# Patient Record
Sex: MALE | Race: WHITE | Employment: OTHER | URBAN - METROPOLITAN AREA
[De-identification: names, ages, dates, MRNs, and addresses within clinical notes are randomized per-mention and may not be internally consistent; named-entity substitution may affect disease eponyms.]

---

## 2019-10-23 ENCOUNTER — ANESTHESIA EVENT (OUTPATIENT)
Dept: ENDOSCOPY | Age: 74
End: 2019-10-23
Payer: MEDICARE

## 2019-10-23 ENCOUNTER — HOSPITAL ENCOUNTER (OUTPATIENT)
Age: 74
Setting detail: OUTPATIENT SURGERY
Discharge: HOME | End: 2019-10-23
Attending: INTERNAL MEDICINE | Admitting: INTERNAL MEDICINE
Payer: MEDICARE

## 2019-10-23 ENCOUNTER — ANESTHESIA (OUTPATIENT)
Dept: ENDOSCOPY | Age: 74
End: 2019-10-23
Payer: MEDICARE

## 2019-10-23 VITALS
BODY MASS INDEX: 22.82 KG/M2 | SYSTOLIC BLOOD PRESSURE: 112 MMHG | WEIGHT: 168.5 LBS | TEMPERATURE: 97.8 F | HEART RATE: 81 BPM | OXYGEN SATURATION: 98 % | DIASTOLIC BLOOD PRESSURE: 69 MMHG | RESPIRATION RATE: 20 BRPM | HEIGHT: 72 IN

## 2019-10-23 PROCEDURE — 88305 TISSUE EXAM BY PATHOLOGIST: CPT

## 2019-10-23 PROCEDURE — 76040000019: Performed by: INTERNAL MEDICINE

## 2019-10-23 PROCEDURE — 77030013992 HC SNR POLYP ENDOSC BSC -B: Performed by: INTERNAL MEDICINE

## 2019-10-23 PROCEDURE — 77030010936 HC CLP LIG BSC -C: Performed by: INTERNAL MEDICINE

## 2019-10-23 PROCEDURE — 74011250636 HC RX REV CODE- 250/636: Performed by: NURSE ANESTHETIST, CERTIFIED REGISTERED

## 2019-10-23 PROCEDURE — 76060000031 HC ANESTHESIA FIRST 0.5 HR: Performed by: INTERNAL MEDICINE

## 2019-10-23 PROCEDURE — 74011000250 HC RX REV CODE- 250: Performed by: NURSE ANESTHETIST, CERTIFIED REGISTERED

## 2019-10-23 RX ORDER — SODIUM CHLORIDE 0.9 % (FLUSH) 0.9 %
5-40 SYRINGE (ML) INJECTION AS NEEDED
Status: DISCONTINUED | OUTPATIENT
Start: 2019-10-23 | End: 2019-10-23 | Stop reason: HOSPADM

## 2019-10-23 RX ORDER — FLUMAZENIL 0.1 MG/ML
0.2 INJECTION INTRAVENOUS
Status: DISCONTINUED | OUTPATIENT
Start: 2019-10-23 | End: 2019-10-23 | Stop reason: HOSPADM

## 2019-10-23 RX ORDER — SODIUM CHLORIDE 9 MG/ML
50 INJECTION, SOLUTION INTRAVENOUS CONTINUOUS
Status: DISCONTINUED | OUTPATIENT
Start: 2019-10-23 | End: 2019-10-23 | Stop reason: HOSPADM

## 2019-10-23 RX ORDER — PRAVASTATIN SODIUM 10 MG/1
10 TABLET ORAL
COMMUNITY

## 2019-10-23 RX ORDER — ATROPINE SULFATE 0.1 MG/ML
0.5 INJECTION INTRAVENOUS
Status: DISCONTINUED | OUTPATIENT
Start: 2019-10-23 | End: 2019-10-23 | Stop reason: HOSPADM

## 2019-10-23 RX ORDER — DEXTROMETHORPHAN/PSEUDOEPHED 2.5-7.5/.8
1.2 DROPS ORAL
Status: DISCONTINUED | OUTPATIENT
Start: 2019-10-23 | End: 2019-10-23 | Stop reason: HOSPADM

## 2019-10-23 RX ORDER — NALOXONE HYDROCHLORIDE 0.4 MG/ML
0.4 INJECTION, SOLUTION INTRAMUSCULAR; INTRAVENOUS; SUBCUTANEOUS
Status: DISCONTINUED | OUTPATIENT
Start: 2019-10-23 | End: 2019-10-23 | Stop reason: HOSPADM

## 2019-10-23 RX ORDER — BUMETANIDE 1 MG/1
1 TABLET ORAL EVERY OTHER DAY
COMMUNITY
End: 2021-02-17

## 2019-10-23 RX ORDER — PROPOFOL 10 MG/ML
INJECTION, EMULSION INTRAVENOUS AS NEEDED
Status: DISCONTINUED | OUTPATIENT
Start: 2019-10-23 | End: 2019-10-23 | Stop reason: HOSPADM

## 2019-10-23 RX ORDER — FLECAINIDE ACETATE 50 MG/1
50 TABLET ORAL 2 TIMES DAILY
COMMUNITY
End: 2021-02-17

## 2019-10-23 RX ORDER — SODIUM CHLORIDE 0.9 % (FLUSH) 0.9 %
5-40 SYRINGE (ML) INJECTION EVERY 8 HOURS
Status: DISCONTINUED | OUTPATIENT
Start: 2019-10-23 | End: 2019-10-23 | Stop reason: HOSPADM

## 2019-10-23 RX ORDER — EPINEPHRINE 0.1 MG/ML
1 INJECTION INTRACARDIAC; INTRAVENOUS
Status: DISCONTINUED | OUTPATIENT
Start: 2019-10-23 | End: 2019-10-23 | Stop reason: HOSPADM

## 2019-10-23 RX ORDER — LIDOCAINE HYDROCHLORIDE 20 MG/ML
INJECTION, SOLUTION EPIDURAL; INFILTRATION; INTRACAUDAL; PERINEURAL AS NEEDED
Status: DISCONTINUED | OUTPATIENT
Start: 2019-10-23 | End: 2019-10-23 | Stop reason: HOSPADM

## 2019-10-23 RX ADMIN — PROPOFOL 100 MG: 10 INJECTION, EMULSION INTRAVENOUS at 10:53

## 2019-10-23 RX ADMIN — PROPOFOL 100 MG: 10 INJECTION, EMULSION INTRAVENOUS at 10:45

## 2019-10-23 RX ADMIN — LIDOCAINE HYDROCHLORIDE 20 MG: 20 INJECTION, SOLUTION EPIDURAL; INFILTRATION; INTRACAUDAL; PERINEURAL at 10:45

## 2019-10-23 NOTE — ROUTINE PROCESS
Jackie Miranda 1945 
[id number] Situation: 
[facility] Procedure: Procedure(s): 
COLONOSCOPY 
ENDOSCOPIC POLYPECTOMY RESOLUTION CLIP Background: 
 
Preoperative diagnosis: HEMO POS STOOL Postoperative diagnosis: Cecal Polyp :  Dr. Hafsa Pablo Assistant(s): Endoscopy Technician-1: Ramiro Carlson Endoscopy RN-1: Joie Reid RN Specimens:  
ID Type Source Tests Collected by Time Destination 1 : Cecal Polyp Preservative   Noelia Cogan, MD 10/23/2019 1056 Pathology H. Pylori  no Assessment: 
Intra-procedure medications Anesthesia gave intra-procedure sedation and medications, see anesthesia flow sheet yes Intravenous fluids: NS@ Lorrain Mehrdad Vital signs stable Abdominal assessment: round and soft Recommendation: 
Discharge patient per MD order. Family or Friend Permission to share finding with family or friend yes

## 2019-10-23 NOTE — PROGRESS NOTES
Received report from anesthesia staff on vital signs and status of patient.     Scope precleaned at bedside by Rosales Scott

## 2019-10-23 NOTE — ANESTHESIA POSTPROCEDURE EVALUATION
Post-Anesthesia Evaluation and Assessment    Patient: Curtis Borja MRN: [id number]  SSN: [id number]    YOB: 1945  Age: 72 y.o. Sex: male      I have evaluated the patient and they are stable and ready for discharge from the PACU. Cardiovascular Function/Vital Signs  Visit Vitals  /79   Pulse 97   Temp 36.6 °C (97.8 °F)   Resp (!) 35   Ht 6' (1.829 m)   Wt 76.4 kg (168 lb 8 oz)   SpO2 99%   BMI 22.85 kg/m²       Patient is status post MAC anesthesia for Procedure(s):  COLONOSCOPY  ENDOSCOPIC POLYPECTOMY  RESOLUTION CLIP. Nausea/Vomiting: None    Postoperative hydration reviewed and adequate. Pain:  Pain Scale 1: Numeric (0 - 10) (10/23/19 1116)  Pain Intensity 1: 0 (10/23/19 1116)   Managed    Neurological Status: At baseline    Mental Status, Level of Consciousness: Alert and  oriented to person, place, and time    Pulmonary Status:   O2 Device: Room air (10/23/19 0947)   Adequate oxygenation and airway patent    Complications related to anesthesia: None    Post-anesthesia assessment completed. No concerns    Signed By: Melly Chang MD     October 23, 2019              Procedure(s):  COLONOSCOPY  ENDOSCOPIC POLYPECTOMY  RESOLUTION CLIP. MAC    <BSHSIANPOST>    Vitals Value Taken Time   /69 10/23/2019 11:25 AM   Temp 36.6 °C (97.8 °F) 10/23/2019 11:16 AM   Pulse 83 10/23/2019 11:26 AM   Resp 24 10/23/2019 11:26 AM   SpO2 98 % 10/23/2019 11:26 AM   Vitals shown include unvalidated device data.

## 2019-10-23 NOTE — DISCHARGE INSTRUCTIONS
[address]  [address]          Nicole Mejia  [id number]  1945    COLON DISCHARGE INSTRUCTIONS    DISCOMFORT:  Redness at IV site- apply warm compress to area; if redness or soreness persist- contact your physician  There may be a slight amount of blood passed from the rectum  Gaseous discomfort- walking, belching will help relieve any discomfort  You may not operate a vehicle for 12 hours  You may not engage in an occupation involving machinery or appliances for rest of today  You may not drink alcoholic beverages for at least 12 hours  Avoid making any critical decisions for at least 24 hour  DIET:   Regular diet. - however -  remember your colon is empty and a heavy meal will produce gas. Avoid these foods:  vegetables, fried / greasy foods, carbonated drinks for today         ACTIVITY:  You may resume your normal daily activities it is recommended that you spend the remainder of the day resting -  avoid any strenuous activity. CALL M.D. ANY SIGN OF:   Increasing pain, nausea, vomiting  Abdominal distension (swelling)  New increased bleeding (oral or rectal)  Fever (chills)  Pain in chest area  Bloody discharge from nose or mouth  Shortness of breath     Follow-up Instructions:   Call Dr. Reina Sharma for any questions or problems. Telephone # [phone]  Biopsy results will be available in  5 to 7 days  Should have a repeat colonoscopy in 5 years  Restart eliquis in 5 days    Impression:  Cecal Polyp  Patient Education        Colon Polyps: Care Instructions  Your Care Instructions    Colon polyps are growths in the colon or the rectum. The cause of most colon polyps is not known, and most people who get them do not have any problems. But a certain kind can turn into cancer. For this reason, regular testing for colon polyps is important for people as they get older.  It is also important for anyone who has an increased risk for colon cancer. Polyps are usually found through routine colon cancer screening tests. Although most colon polyps are not cancerous, they are usually removed and then tested for cancer. Screening for colon cancer saves lives because the cancer can usually be cured if it is caught early. If you have a polyp that is the type that can turn into cancer, you may need more tests to examine your entire colon. The doctor will remove any other polyps that he or she finds, and you will be tested more often. Follow-up care is a key part of your treatment and safety. Be sure to make and go to all appointments, and call your doctor if you are having problems. It's also a good idea to know your test results and keep a list of the medicines you take. How can you care for yourself at home? Regular exams to look for colon polyps are the best way to prevent polyps from turning into colon cancer. These can include stool tests, sigmoidoscopy, colonoscopy, and CT colonography. Talk with your doctor about a testing schedule that is right for you. To prevent polyps  There is no home treatment that can prevent colon polyps. But these steps may help lower your risk for cancer. · Stay active. Being active can help you get to and stay at a healthy weight. Try to exercise on most days of the week. Walking is a good choice. · Eat well. Choose a variety of vegetables, fruits, legumes (such as peas and beans), fish, poultry, and whole grains. · Do not smoke. If you need help quitting, talk to your doctor about stop-smoking programs and medicines. These can increase your chances of quitting for good. · If you drink alcohol, limit how much you drink. Limit alcohol to 2 drinks a day for men and 1 drink a day for women. When should you call for help?   Call your doctor now or seek immediate medical care if:    · You have severe belly pain.     · Your stools are maroon or very bloody.    Watch closely for changes in your health, and be sure to contact your doctor if:    · You have a fever.     · You have nausea or vomiting.     · You have a change in bowel habits (new constipation or diarrhea).     · Your symptoms get worse or are not improving as expected. Where can you learn more? Go to http://tigre-monika.info/. Enter 95 [id number] in the search box to learn more about \"Colon Polyps: Care Instructions. \"  Current as of: December 19, 2018  Content Version: 12.2  © 7539-9440 [employer]. Care instructions adapted under license by [employer] (which disclaims liability or warranty for this information). If you have questions about a medical condition or this instruction, always ask your healthcare professional. [address] any warranty or liability for your use of this information.

## 2019-10-23 NOTE — H&P
Eliud 66  174 [facility], [address] [place] [address]                 Jacob Serrano is a  72 y.o.  male who presents with heme positive stool, but has been on eliquis. No GI symptoms .         Past Medical History:   Diagnosis Date    [facility] [address]     Borderline hypothyroidism     Diabetes type 1, controlled ([facility] [address]     Patient has insulin pump    History of cardioversion October 2014    PVC (premature ventricular contraction)     S/P ablation of atrial flutter March 2016     Past Surgical History:   Procedure Laterality Date    COLONOSCOPY N/A 6/21/2016    COLONOSCOPY performed by Diane Pittman MD at [address] HX CATARACT REMOVAL Bilateral     HX COLONOSCOPY  2011    HX OTHER SURGICAL  March 2016    A-flutter ablation    HX OTHER SURGICAL      Penile implant    HX TONSILLECTOMY       Allergies   Allergen Reactions    Hydrocodone Other (comments)     Upset stomach    Peanut Sneezing    Sulfa (Sulfonamide Antibiotics) Runny Nose and Sneezing     Current Facility-Administered Medications   Medication Dose Route Frequency Provider Last Rate Last Dose    0.9% sodium chloride infusion  50 mL/hr IntraVENous CONTINUOUS Jhoan Waite MD        sodium chloride (NS) flush 5-40 mL  5-40 mL IntraVENous Q8H Jhoan Waite MD        sodium chloride (NS) flush 5-40 mL  5-40 mL IntraVENous PRN Jhoan Waite MD        naloxone [facility]) injection 0.4 mg  0.4 mg IntraVENous Ira Waite MD        flumazenil (ROMAZICON) 0.1 mg/mL injection 0.2 mg  0.2 mg IntraVENous Ira Waite MD        simethicone (MYLICON) 62RM/4.4YT oral drops 80 mg  1.2 mL Oral Ira Waite MD        atropine injection 0.5 mg  0.5 mg IntraVENous ONCE PRN Jhoan Waite MD        EPINEPHrine (ADRENALIN) 0.1 mg/mL syringe 1 mg  1 mg Endoscopically ONCE PRN Jhoan Waite MD           Visit Vitals  /85   Pulse 93   Resp 17   Ht 6' (1.829 m) Wt 76.4 kg (168 lb 8 oz)   SpO2 99%   BMI 22.85 kg/m²           PHYSICAL EXAM:  General: WD, WN. Alert, cooperative, no acute distress    HEENT: NC, Atraumatic. PERRLA, EOMI. Anicteric sclerae. Mallampati score 2  Lungs:  CTA Bilaterally. No Wheezing/Rhonchi/Rales. Heart:  Regular  rhythm,  No murmur (), No Rubs, No Gallops  Abdomen: Soft, Non distended, Non tender.  +Bowel sounds, no HSM  Extremities: No c/c/e  Neurologic:  CN 2-12 gi, Alert and oriented X 3. No acute neurological distress   Psych:   Good insight. Not anxious nor agitated. Plan:   Endoscopic procedure with MAC.     Carley Mckeon MD  10/23/2019  10:42 AM

## 2019-10-23 NOTE — ANESTHESIA PREPROCEDURE EVALUATION
Anesthetic History   No history of anesthetic complications            Review of Systems / Medical History  Patient summary reviewed, nursing notes reviewed and pertinent labs reviewed    Pulmonary  Within defined limits                 Neuro/Psych   Within defined limits           Cardiovascular    Hypertension        Dysrhythmias : atrial flutter and PVC  Hyperlipidemia      Comments: S/p ablation   GI/Hepatic/Renal  Within defined limits              Endo/Other    Diabetes: type 1, using insulin         Other Findings   Comments: DM neuropathy/ retinopathy           Physical Exam    Airway  Mallampati: II  TM Distance: > 6 cm  Neck ROM: normal range of motion   Mouth opening: Normal     Cardiovascular  Regular rate and rhythm,  S1 and S2 normal,  no murmur, click, rub, or gallop             Dental  No notable dental hx       Pulmonary  Breath sounds clear to auscultation               Abdominal  GI exam deferred       Other Findings            Anesthetic Plan    ASA: 3  Anesthesia type: MAC          Induction: Intravenous  Anesthetic plan and risks discussed with: Patient

## 2019-10-23 NOTE — PROCEDURES
Eliud 66  174 [facility], [address] [place] [address]              :  Gurjit Torres MD    Surgical Assistant: None    Implants: None    Referring Provider: Judith Rosa MD    Sedation:  MAC anesthesia Propofol        Prior to the procedure its objectives, risks, consequences and alternatives were discussed with the patient who then elected to proceed. The patient had the opportunity to ask questions and those questions were answered. A physical exam was performed. The heart, lungs, and mental status were examined prior to the procedure and found to be satisfactory for conscious sedation and for the procedure. Conscious sedation was initiated by the physician. Continuous pulse oximetry and blood pressure monitoring were used throughout the procedure. After appropriate analgesia and rectal exam, the colonoscope was passed into the anus and passed to the cecum without difficulty. The prep was good. On slow withdrawal of the scope, there was an 8 mm sessile polyp in the base of the cecum The polyp was removed with snare and cautery and 2 clips were as he has to restart eliquis soon. The ascending colon, hepatic flexure, transverse colon, splenic flexure, descending colon, sigmoid and rectum were normal. Retroflexion exam of the rectum was normal. He tolerated the procedure without complication and I recommend a repeat colonoscopy in 5 years. Specimen cecal polyp    Complications: None. EBL:  None.     Gurjit Torres MD  10/23/2019  11:07 AM

## 2021-02-05 ENCOUNTER — TRANSCRIBE ORDER (OUTPATIENT)
Dept: REGISTRATION | Age: 76
End: 2021-02-05

## 2021-02-05 DIAGNOSIS — Z01.812 PRE-PROCEDURE LAB EXAM: Primary | ICD-10-CM

## 2021-02-05 NOTE — PERIOP NOTES
PATIENT CALLED AND MADE AWARE OF COVID-19 TESTING REQUIRED TO BE DONE WITHIN 96 HOURS OF SURGERY. COVID-19 TESTING APPOINTMENT MADE FOR PATIENT. INSTRUCTED ON SELF QUARANTINE BETWEEN TESTING AND ARRIVAL TIME DAY OF SURGERY. INSTRUCTED NOT TO USE NASAL SPRAY OR OINTMENTS DAY OF TESTING, PRIOR TO TEST. LOCATION OF TESTING DISCUSSED.

## 2021-02-14 ENCOUNTER — HOSPITAL ENCOUNTER (OUTPATIENT)
Dept: PREADMISSION TESTING | Age: 76
Discharge: HOME | End: 2021-02-14
Payer: MEDICARE

## 2021-02-14 DIAGNOSIS — Z01.812 PRE-PROCEDURE LAB EXAM: ICD-10-CM

## 2021-02-14 PROCEDURE — U0003 INFECTIOUS AGENT DETECTION BY NUCLEIC ACID (DNA OR RNA); SEVERE ACUTE RESPIRATORY SYNDROME CORONAVIRUS 2 (SARS-COV-2) (CORONAVIRUS DISEASE [COVID-19]), AMPLIFIED PROBE TECHNIQUE, MAKING USE OF HIGH THROUGHPUT TECHNOLOGIES AS DESCRIBED BY CMS-2020-01-R: HCPCS

## 2021-02-15 LAB — SARS-COV-2, COV2NT: NOT DETECTED

## 2021-02-17 RX ORDER — VERAPAMIL HYDROCHLORIDE 240 MG/1
240 CAPSULE, EXTENDED RELEASE ORAL
COMMUNITY

## 2021-02-17 RX ORDER — ASPIRIN 81 MG/1
81 TABLET ORAL DAILY
COMMUNITY

## 2021-02-17 NOTE — PERIOP NOTES
Preop phone call completed. Preop instructions and preop medications reveiwed with patient. Pt instructed to Purchase 2  4 oz bottles of CHG soap and instructed in use. PT HAS INSULIN PUMP. DTC REFERRAL INITIATED. [facility]PO[facility] AND NOTIFIED HER PT IS SCHEDULED FOR SURGERY 2/18/21. Called for cardiology and nephrology notes    Nephrology notes received and placed on chart. CARDIOLOGY NOTES/CARDIAC CLEARANCE RECEIVED AND PLACED ON CHART.

## 2021-02-18 ENCOUNTER — HOSPITAL ENCOUNTER (OUTPATIENT)
Age: 76
Setting detail: OBSERVATION
Discharge: HOME | End: 2021-02-21
Attending: PODIATRIST | Admitting: PODIATRIST
Payer: MEDICARE

## 2021-02-18 ENCOUNTER — APPOINTMENT (OUTPATIENT)
Dept: GENERAL RADIOLOGY | Age: 76
End: 2021-02-18
Attending: PODIATRIST
Payer: MEDICARE

## 2021-02-18 ENCOUNTER — ANESTHESIA EVENT (OUTPATIENT)
Dept: SURGERY | Age: 76
End: 2021-02-18
Payer: MEDICARE

## 2021-02-18 ENCOUNTER — ANESTHESIA (OUTPATIENT)
Dept: SURGERY | Age: 76
End: 2021-02-18
Payer: MEDICARE

## 2021-02-18 DIAGNOSIS — M14.672 CHARCOT'S JOINT OF LEFT FOOT: Primary | ICD-10-CM

## 2021-02-18 DIAGNOSIS — E10.65 HYPERGLYCEMIA DUE TO TYPE 1 DIABETES MELLITUS (HCC): ICD-10-CM

## 2021-02-18 LAB
GLUCOSE BLD STRIP.AUTO-MCNC: 103 MG/DL (ref 65–100)
GLUCOSE BLD STRIP.AUTO-MCNC: 113 MG/DL (ref 65–100)
GLUCOSE BLD STRIP.AUTO-MCNC: 124 MG/DL (ref 65–100)
GLUCOSE BLD STRIP.AUTO-MCNC: 145 MG/DL (ref 65–100)
GLUCOSE BLD STRIP.AUTO-MCNC: 160 MG/DL (ref 65–100)
GLUCOSE BLD STRIP.AUTO-MCNC: 82 MG/DL (ref 65–100)
GLUCOSE BLD STRIP.AUTO-MCNC: 98 MG/DL (ref 65–100)
SERVICE CMNT-IMP: ABNORMAL
SERVICE CMNT-IMP: NORMAL
SERVICE CMNT-IMP: NORMAL

## 2021-02-18 PROCEDURE — 77030038552 HC DRN WND MDII -A: Performed by: PODIATRIST

## 2021-02-18 PROCEDURE — 73620 X-RAY EXAM OF FOOT: CPT

## 2021-02-18 PROCEDURE — C1713 ANCHOR/SCREW BN/BN,TIS/BN: HCPCS | Performed by: PODIATRIST

## 2021-02-18 PROCEDURE — 77030008684 HC TU ET CUF COVD -B: Performed by: ANESTHESIOLOGY

## 2021-02-18 PROCEDURE — 77030031139 HC SUT VCRL2 J&J -A: Performed by: PODIATRIST

## 2021-02-18 PROCEDURE — 77030020268 HC MISC GENERAL SUPPLY: Performed by: PODIATRIST

## 2021-02-18 PROCEDURE — 74011250636 HC RX REV CODE- 250/636: Performed by: NURSE ANESTHETIST, CERTIFIED REGISTERED

## 2021-02-18 PROCEDURE — 82962 GLUCOSE BLOOD TEST: CPT

## 2021-02-18 PROCEDURE — 77030025006 HC BUR STRY -C: Performed by: PODIATRIST

## 2021-02-18 PROCEDURE — 74011250636 HC RX REV CODE- 250/636: Performed by: ANESTHESIOLOGY

## 2021-02-18 PROCEDURE — 2709999900 HC NON-CHARGEABLE SUPPLY: Performed by: PODIATRIST

## 2021-02-18 PROCEDURE — 74011000250 HC RX REV CODE- 250: Performed by: PODIATRIST

## 2021-02-18 PROCEDURE — 74011000250 HC RX REV CODE- 250: Performed by: NURSE ANESTHETIST, CERTIFIED REGISTERED

## 2021-02-18 PROCEDURE — 77030002916 HC SUT ETHLN J&J -A: Performed by: PODIATRIST

## 2021-02-18 PROCEDURE — 77030000032 HC CUF TRNQT ZIMM -B: Performed by: PODIATRIST

## 2021-02-18 PROCEDURE — 77030040922 HC BLNKT HYPOTHRM STRY -A

## 2021-02-18 PROCEDURE — C1769 GUIDE WIRE: HCPCS | Performed by: PODIATRIST

## 2021-02-18 PROCEDURE — 77030002922 HC SUT FBRWRE ARTH -B: Performed by: PODIATRIST

## 2021-02-18 PROCEDURE — 74011250637 HC RX REV CODE- 250/637: Performed by: ANESTHESIOLOGY

## 2021-02-18 PROCEDURE — 77030010435 HC GRFT BN SUB PGNX MEDT -G1: Performed by: PODIATRIST

## 2021-02-18 PROCEDURE — 74011250636 HC RX REV CODE- 250/636: Performed by: PODIATRIST

## 2021-02-18 PROCEDURE — 77030020269 HC MISC IMPL: Performed by: PODIATRIST

## 2021-02-18 PROCEDURE — 76060000052 HC ANESTHESIA 10.5 TO 11 HR: Performed by: PODIATRIST

## 2021-02-18 PROCEDURE — 76210000017 HC OR PH I REC 1.5 TO 2 HR: Performed by: PODIATRIST

## 2021-02-18 PROCEDURE — 76010000150: Performed by: PODIATRIST

## 2021-02-18 PROCEDURE — 77030003601 HC NDL NRV BLK BBMI -A

## 2021-02-18 PROCEDURE — 77030026438 HC STYL ET INTUB CARD -A: Performed by: ANESTHESIOLOGY

## 2021-02-18 PROCEDURE — 99218 HC RM OBSERVATION: CPT

## 2021-02-18 DEVICE — SCREW, HEADLESS 6.5X070MM_TI GREEN
Type: IMPLANTABLE DEVICE | Site: ANKLE | Status: FUNCTIONAL
Brand: VILEX DUAL THREAD SCREW

## 2021-02-18 DEVICE — ANCHOR SUTURE BIOCOMP 3X14.5 MM FIBERWIRE 2 TIGERWIRE: Type: IMPLANTABLE DEVICE | Site: ANKLE | Status: FUNCTIONAL

## 2021-02-18 DEVICE — GRAFT BNE SUB 10ML DEMIN BNE MTRX PROGENIX +: Type: IMPLANTABLE DEVICE | Site: ANKLE | Status: FUNCTIONAL

## 2021-02-18 RX ORDER — SODIUM CHLORIDE 0.9 % (FLUSH) 0.9 %
5-40 SYRINGE (ML) INJECTION AS NEEDED
Status: DISCONTINUED | OUTPATIENT
Start: 2021-02-18 | End: 2021-02-19 | Stop reason: HOSPADM

## 2021-02-18 RX ORDER — LIDOCAINE HYDROCHLORIDE 10 MG/ML
0.1 INJECTION, SOLUTION EPIDURAL; INFILTRATION; INTRACAUDAL; PERINEURAL AS NEEDED
Status: DISCONTINUED | OUTPATIENT
Start: 2021-02-18 | End: 2021-02-19 | Stop reason: HOSPADM

## 2021-02-18 RX ORDER — SODIUM CHLORIDE 9 MG/ML
1000 INJECTION, SOLUTION INTRAVENOUS CONTINUOUS
Status: DISCONTINUED | OUTPATIENT
Start: 2021-02-18 | End: 2021-02-19 | Stop reason: HOSPADM

## 2021-02-18 RX ORDER — DEXAMETHASONE SODIUM PHOSPHATE 4 MG/ML
INJECTION, SOLUTION INTRA-ARTICULAR; INTRALESIONAL; INTRAMUSCULAR; INTRAVENOUS; SOFT TISSUE AS NEEDED
Status: DISCONTINUED | OUTPATIENT
Start: 2021-02-18 | End: 2021-02-19 | Stop reason: HOSPADM

## 2021-02-18 RX ORDER — MORPHINE SULFATE 10 MG/ML
2 INJECTION, SOLUTION INTRAMUSCULAR; INTRAVENOUS
Status: DISCONTINUED | OUTPATIENT
Start: 2021-02-18 | End: 2021-02-19 | Stop reason: HOSPADM

## 2021-02-18 RX ORDER — FENTANYL CITRATE 50 UG/ML
INJECTION, SOLUTION INTRAMUSCULAR; INTRAVENOUS AS NEEDED
Status: DISCONTINUED | OUTPATIENT
Start: 2021-02-18 | End: 2021-02-19 | Stop reason: HOSPADM

## 2021-02-18 RX ORDER — ONDANSETRON 2 MG/ML
4 INJECTION INTRAMUSCULAR; INTRAVENOUS AS NEEDED
Status: DISCONTINUED | OUTPATIENT
Start: 2021-02-18 | End: 2021-02-19 | Stop reason: HOSPADM

## 2021-02-18 RX ORDER — ROPIVACAINE HYDROCHLORIDE 5 MG/ML
INJECTION, SOLUTION EPIDURAL; INFILTRATION; PERINEURAL
Status: COMPLETED | OUTPATIENT
Start: 2021-02-18 | End: 2021-02-18

## 2021-02-18 RX ORDER — SODIUM CHLORIDE, SODIUM LACTATE, POTASSIUM CHLORIDE, CALCIUM CHLORIDE 600; 310; 30; 20 MG/100ML; MG/100ML; MG/100ML; MG/100ML
125 INJECTION, SOLUTION INTRAVENOUS CONTINUOUS
Status: DISCONTINUED | OUTPATIENT
Start: 2021-02-18 | End: 2021-02-19 | Stop reason: HOSPADM

## 2021-02-18 RX ORDER — OXYCODONE AND ACETAMINOPHEN 5; 325 MG/1; MG/1
1 TABLET ORAL AS NEEDED
Status: DISCONTINUED | OUTPATIENT
Start: 2021-02-18 | End: 2021-02-19 | Stop reason: HOSPADM

## 2021-02-18 RX ORDER — ACETAMINOPHEN 325 MG/1
650 TABLET ORAL ONCE
Status: COMPLETED | OUTPATIENT
Start: 2021-02-18 | End: 2021-02-18

## 2021-02-18 RX ORDER — EPHEDRINE SULFATE/0.9% NACL/PF 50 MG/5 ML
5 SYRINGE (ML) INTRAVENOUS AS NEEDED
Status: DISCONTINUED | OUTPATIENT
Start: 2021-02-18 | End: 2021-02-19 | Stop reason: HOSPADM

## 2021-02-18 RX ORDER — SODIUM CHLORIDE 0.9 % (FLUSH) 0.9 %
5-40 SYRINGE (ML) INJECTION EVERY 8 HOURS
Status: DISCONTINUED | OUTPATIENT
Start: 2021-02-18 | End: 2021-02-19 | Stop reason: HOSPADM

## 2021-02-18 RX ORDER — DEXTROSE 50 % IN WATER (D50W) INTRAVENOUS SYRINGE
AS NEEDED
Status: DISCONTINUED | OUTPATIENT
Start: 2021-02-18 | End: 2021-02-19 | Stop reason: HOSPADM

## 2021-02-18 RX ORDER — SUCCINYLCHOLINE CHLORIDE 20 MG/ML
INJECTION INTRAMUSCULAR; INTRAVENOUS AS NEEDED
Status: DISCONTINUED | OUTPATIENT
Start: 2021-02-18 | End: 2021-02-19 | Stop reason: HOSPADM

## 2021-02-18 RX ORDER — SODIUM CHLORIDE, SODIUM LACTATE, POTASSIUM CHLORIDE, CALCIUM CHLORIDE 600; 310; 30; 20 MG/100ML; MG/100ML; MG/100ML; MG/100ML
INJECTION, SOLUTION INTRAVENOUS
Status: DISCONTINUED | OUTPATIENT
Start: 2021-02-18 | End: 2021-02-19 | Stop reason: HOSPADM

## 2021-02-18 RX ORDER — PROPOFOL 10 MG/ML
INJECTION, EMULSION INTRAVENOUS AS NEEDED
Status: DISCONTINUED | OUTPATIENT
Start: 2021-02-18 | End: 2021-02-19 | Stop reason: HOSPADM

## 2021-02-18 RX ORDER — SODIUM CHLORIDE 9 MG/ML
50 INJECTION, SOLUTION INTRAVENOUS CONTINUOUS
Status: DISCONTINUED | OUTPATIENT
Start: 2021-02-18 | End: 2021-02-19 | Stop reason: HOSPADM

## 2021-02-18 RX ORDER — FENTANYL CITRATE 50 UG/ML
25 INJECTION, SOLUTION INTRAMUSCULAR; INTRAVENOUS
Status: COMPLETED | OUTPATIENT
Start: 2021-02-18 | End: 2021-02-19

## 2021-02-18 RX ORDER — HYDROMORPHONE HYDROCHLORIDE 1 MG/ML
0.2 INJECTION, SOLUTION INTRAMUSCULAR; INTRAVENOUS; SUBCUTANEOUS
Status: DISCONTINUED | OUTPATIENT
Start: 2021-02-18 | End: 2021-02-19 | Stop reason: HOSPADM

## 2021-02-18 RX ORDER — MIDAZOLAM HYDROCHLORIDE 1 MG/ML
1 INJECTION, SOLUTION INTRAMUSCULAR; INTRAVENOUS AS NEEDED
Status: DISCONTINUED | OUTPATIENT
Start: 2021-02-18 | End: 2021-02-19 | Stop reason: HOSPADM

## 2021-02-18 RX ORDER — FENTANYL CITRATE 50 UG/ML
50 INJECTION, SOLUTION INTRAMUSCULAR; INTRAVENOUS AS NEEDED
Status: DISCONTINUED | OUTPATIENT
Start: 2021-02-18 | End: 2021-02-19 | Stop reason: HOSPADM

## 2021-02-18 RX ORDER — ROCURONIUM BROMIDE 10 MG/ML
INJECTION, SOLUTION INTRAVENOUS AS NEEDED
Status: DISCONTINUED | OUTPATIENT
Start: 2021-02-18 | End: 2021-02-19 | Stop reason: HOSPADM

## 2021-02-18 RX ORDER — DIPHENHYDRAMINE HYDROCHLORIDE 50 MG/ML
12.5 INJECTION, SOLUTION INTRAMUSCULAR; INTRAVENOUS AS NEEDED
Status: ACTIVE | OUTPATIENT
Start: 2021-02-18 | End: 2021-02-18

## 2021-02-18 RX ORDER — MIDAZOLAM HYDROCHLORIDE 1 MG/ML
INJECTION, SOLUTION INTRAMUSCULAR; INTRAVENOUS AS NEEDED
Status: DISCONTINUED | OUTPATIENT
Start: 2021-02-18 | End: 2021-02-19 | Stop reason: HOSPADM

## 2021-02-18 RX ORDER — LIDOCAINE HYDROCHLORIDE 20 MG/ML
INJECTION, SOLUTION EPIDURAL; INFILTRATION; INTRACAUDAL; PERINEURAL AS NEEDED
Status: DISCONTINUED | OUTPATIENT
Start: 2021-02-18 | End: 2021-02-19 | Stop reason: HOSPADM

## 2021-02-18 RX ORDER — KETAMINE HYDROCHLORIDE 10 MG/ML
INJECTION, SOLUTION INTRAMUSCULAR; INTRAVENOUS AS NEEDED
Status: DISCONTINUED | OUTPATIENT
Start: 2021-02-18 | End: 2021-02-19 | Stop reason: HOSPADM

## 2021-02-18 RX ORDER — MIDAZOLAM HYDROCHLORIDE 1 MG/ML
0.5 INJECTION, SOLUTION INTRAMUSCULAR; INTRAVENOUS
Status: DISCONTINUED | OUTPATIENT
Start: 2021-02-18 | End: 2021-02-19 | Stop reason: HOSPADM

## 2021-02-18 RX ORDER — PHENYLEPHRINE HCL IN 0.9% NACL 0.4MG/10ML
SYRINGE (ML) INTRAVENOUS AS NEEDED
Status: DISCONTINUED | OUTPATIENT
Start: 2021-02-18 | End: 2021-02-19 | Stop reason: HOSPADM

## 2021-02-18 RX ADMIN — PROPOFOL 50 MG: 10 INJECTION, EMULSION INTRAVENOUS at 15:49

## 2021-02-18 RX ADMIN — SODIUM CHLORIDE, POTASSIUM CHLORIDE, SODIUM LACTATE AND CALCIUM CHLORIDE 125 ML/HR: 600; 310; 30; 20 INJECTION, SOLUTION INTRAVENOUS at 10:38

## 2021-02-18 RX ADMIN — FENTANYL CITRATE 100 MCG: 50 INJECTION, SOLUTION INTRAMUSCULAR; INTRAVENOUS at 11:28

## 2021-02-18 RX ADMIN — PROPOFOL 150 MG: 10 INJECTION, EMULSION INTRAVENOUS at 15:39

## 2021-02-18 RX ADMIN — SODIUM CHLORIDE, POTASSIUM CHLORIDE, SODIUM LACTATE AND CALCIUM CHLORIDE: 600; 310; 30; 20 INJECTION, SOLUTION INTRAVENOUS at 15:00

## 2021-02-18 RX ADMIN — FENTANYL CITRATE 100 MCG: 50 INJECTION, SOLUTION INTRAMUSCULAR; INTRAVENOUS at 15:39

## 2021-02-18 RX ADMIN — MIDAZOLAM 2 MG: 1 INJECTION INTRAMUSCULAR; INTRAVENOUS at 11:30

## 2021-02-18 RX ADMIN — DEXTROSE MONOHYDRATE 15 ML: 25 INJECTION, SOLUTION INTRAVENOUS at 17:50

## 2021-02-18 RX ADMIN — Medication 120 MCG: at 16:35

## 2021-02-18 RX ADMIN — KETAMINE HYDROCHLORIDE 30 MG: 10 INJECTION, SOLUTION INTRAMUSCULAR; INTRAVENOUS at 16:24

## 2021-02-18 RX ADMIN — ROCURONIUM BROMIDE 10 MG: 10 SOLUTION INTRAVENOUS at 15:39

## 2021-02-18 RX ADMIN — WATER 2 G: 1 INJECTION INTRAMUSCULAR; INTRAVENOUS; SUBCUTANEOUS at 16:15

## 2021-02-18 RX ADMIN — SUCCINYLCHOLINE CHLORIDE 160 MG: 20 INJECTION, SOLUTION INTRAMUSCULAR; INTRAVENOUS at 15:39

## 2021-02-18 RX ADMIN — SODIUM CHLORIDE, POTASSIUM CHLORIDE, SODIUM LACTATE AND CALCIUM CHLORIDE: 600; 310; 30; 20 INJECTION, SOLUTION INTRAVENOUS at 18:30

## 2021-02-18 RX ADMIN — ROPIVACAINE HYDROCHLORIDE 30 ML: 5 INJECTION, SOLUTION EPIDURAL; INFILTRATION; PERINEURAL at 11:45

## 2021-02-18 RX ADMIN — Medication 120 MCG: at 20:53

## 2021-02-18 RX ADMIN — Medication 120 MCG: at 16:05

## 2021-02-18 RX ADMIN — WATER 2 G: 1 INJECTION INTRAMUSCULAR; INTRAVENOUS; SUBCUTANEOUS at 20:15

## 2021-02-18 RX ADMIN — Medication 120 MCG: at 23:58

## 2021-02-18 RX ADMIN — ROCURONIUM BROMIDE 30 MG: 10 SOLUTION INTRAVENOUS at 23:50

## 2021-02-18 RX ADMIN — DEXAMETHASONE SODIUM PHOSPHATE 8 MG: 4 INJECTION, SOLUTION INTRAMUSCULAR; INTRAVENOUS at 15:50

## 2021-02-18 RX ADMIN — PHENYLEPHRINE HYDROCHLORIDE 40 MCG/MIN: 10 INJECTION INTRAVENOUS at 16:09

## 2021-02-18 RX ADMIN — MIDAZOLAM 2 MG: 1 INJECTION INTRAMUSCULAR; INTRAVENOUS at 15:31

## 2021-02-18 RX ADMIN — KETAMINE HYDROCHLORIDE 20 MG: 10 INJECTION, SOLUTION INTRAMUSCULAR; INTRAVENOUS at 20:15

## 2021-02-18 RX ADMIN — ACETAMINOPHEN 650 MG: 325 TABLET ORAL at 10:55

## 2021-02-18 RX ADMIN — LIDOCAINE HYDROCHLORIDE 80 MG: 20 INJECTION, SOLUTION EPIDURAL; INFILTRATION; INTRACAUDAL; PERINEURAL at 15:39

## 2021-02-18 NOTE — ANESTHESIA PREPROCEDURE EVALUATION
Relevant Problems   No relevant active problems       Anesthetic History   No history of anesthetic complications            Review of Systems / Medical History  Patient summary reviewed, nursing notes reviewed and pertinent labs reviewed    Pulmonary  Within defined limits                 Neuro/Psych   Within defined limits           Cardiovascular  Within defined limits  Hypertension        Dysrhythmias : atrial fibrillation and atrial flutter           GI/Hepatic/Renal  Within defined limits              Endo/Other  Within defined limits  Diabetes: type 1, using insulin  Hypothyroidism  Arthritis     Other Findings              Physical Exam    Airway  Mallampati: II  TM Distance: > 6 cm  Neck ROM: normal range of motion   Mouth opening: Normal     Cardiovascular  Regular rate and rhythm,  S1 and S2 normal,  no murmur, click, rub, or gallop             Dental  No notable dental hx       Pulmonary  Breath sounds clear to auscultation               Abdominal  GI exam deferred       Other Findings            Anesthetic Plan    ASA: 3  Anesthesia type: general          Induction: Intravenous  Anesthetic plan and risks discussed with: Patient

## 2021-02-18 NOTE — PERIOP NOTES
[phone] call to patients wife for surgical update per patients . [id number]  Surgical update to patients wife.

## 2021-02-18 NOTE — ANESTHESIA PROCEDURE NOTES
Peripheral Block    Start time: 2/18/2021 11:39 AM  End time: 2/18/2021 11:49 AM  Performed by: Roberta Moreno MD  Authorized by: Roberta Moreno MD       Pre-procedure:    Indications: at surgeon's request, post-op pain management and procedure for pain    Preanesthetic Checklist: patient identified, risks and benefits discussed, site marked, timeout performed, anesthesia consent given and patient being monitored    Timeout Time: 11:39          Block Type:   Block Type:  Popliteal  Laterality:  Left  Monitoring:  Standard ASA monitoring, continuous pulse ox, frequent vital sign checks, heart rate, responsive to questions and oxygen  Injection Technique:  Single shot  Procedures: ultrasound guided and nerve stimulator    Patient Position: supine  Prep: chlorhexidine    Location:  Lower thigh  Needle Type:  Stimuplex  Needle Gauge:  22 G  Needle Localization:  Nerve stimulator and ultrasound guidance  Motor Response comment:   Motor Response: minimal motor response >0.4 mA   Medication Injected:  Ropivacaine (PF) (NAROPIN)(0.5%) 5 mg/mL injection, 30 mL  Med Admin Time: 2/18/2021 11:45 AM    Assessment:  Number of attempts:  1  Injection Assessment:  Incremental injection every 5 mL, local visualized surrounding nerve on ultrasound, negative aspiration for CSF, negative aspiration for blood, no paresthesia and no intravascular symptoms  Patient tolerance:  Patient tolerated the procedure well with no immediate complications

## 2021-02-19 PROBLEM — M14.672 CHARCOT ANKLE, LEFT: Status: ACTIVE | Noted: 2021-02-19

## 2021-02-19 LAB
GLUCOSE BLD STRIP.AUTO-MCNC: 188 MG/DL (ref 65–100)
GLUCOSE BLD STRIP.AUTO-MCNC: 253 MG/DL (ref 65–100)
GLUCOSE BLD STRIP.AUTO-MCNC: 294 MG/DL (ref 65–100)
GLUCOSE BLD STRIP.AUTO-MCNC: 327 MG/DL (ref 65–100)
SERVICE CMNT-IMP: ABNORMAL

## 2021-02-19 PROCEDURE — 74011000258 HC RX REV CODE- 258: Performed by: PODIATRIST

## 2021-02-19 PROCEDURE — 99218 HC RM OBSERVATION: CPT

## 2021-02-19 PROCEDURE — 99356 PR PROLONGED SVC I/P OR OBS SETTING 1ST HOUR: CPT | Performed by: CLINICAL NURSE SPECIALIST

## 2021-02-19 PROCEDURE — 74011250636 HC RX REV CODE- 250/636: Performed by: PODIATRIST

## 2021-02-19 PROCEDURE — 77030005513 HC CATH URETH FOL11 MDII -B

## 2021-02-19 PROCEDURE — 82962 GLUCOSE BLOOD TEST: CPT

## 2021-02-19 PROCEDURE — 74011000250 HC RX REV CODE- 250: Performed by: PODIATRIST

## 2021-02-19 PROCEDURE — 74011250636 HC RX REV CODE- 250/636: Performed by: NURSE ANESTHETIST, CERTIFIED REGISTERED

## 2021-02-19 PROCEDURE — 74011250637 HC RX REV CODE- 250/637: Performed by: PODIATRIST

## 2021-02-19 PROCEDURE — 99233 SBSQ HOSP IP/OBS HIGH 50: CPT | Performed by: CLINICAL NURSE SPECIALIST

## 2021-02-19 PROCEDURE — 74011636637 HC RX REV CODE- 636/637: Performed by: PODIATRIST

## 2021-02-19 PROCEDURE — 97530 THERAPEUTIC ACTIVITIES: CPT

## 2021-02-19 PROCEDURE — 97161 PT EVAL LOW COMPLEX 20 MIN: CPT

## 2021-02-19 PROCEDURE — 51798 US URINE CAPACITY MEASURE: CPT

## 2021-02-19 PROCEDURE — 74011250636 HC RX REV CODE- 250/636: Performed by: ANESTHESIOLOGY

## 2021-02-19 PROCEDURE — 74011250637 HC RX REV CODE- 250/637: Performed by: STUDENT IN AN ORGANIZED HEALTH CARE EDUCATION/TRAINING PROGRAM

## 2021-02-19 PROCEDURE — 74011000250 HC RX REV CODE- 250: Performed by: NURSE ANESTHETIST, CERTIFIED REGISTERED

## 2021-02-19 RX ORDER — GLYCOPYRROLATE 0.2 MG/ML
INJECTION INTRAMUSCULAR; INTRAVENOUS AS NEEDED
Status: DISCONTINUED | OUTPATIENT
Start: 2021-02-19 | End: 2021-02-19 | Stop reason: HOSPADM

## 2021-02-19 RX ORDER — FLUTICASONE PROPIONATE 50 MCG
2 SPRAY, SUSPENSION (ML) NASAL DAILY
Status: DISCONTINUED | OUTPATIENT
Start: 2021-02-19 | End: 2021-02-21 | Stop reason: HOSPADM

## 2021-02-19 RX ORDER — INSULIN ASPART 100 [IU]/ML
INJECTION, SOLUTION INTRAVENOUS; SUBCUTANEOUS ONCE
Status: COMPLETED | OUTPATIENT
Start: 2021-02-19 | End: 2021-02-19

## 2021-02-19 RX ORDER — HYDROMORPHONE HYDROCHLORIDE 2 MG/ML
INJECTION, SOLUTION INTRAMUSCULAR; INTRAVENOUS; SUBCUTANEOUS AS NEEDED
Status: DISCONTINUED | OUTPATIENT
Start: 2021-02-19 | End: 2021-02-19 | Stop reason: HOSPADM

## 2021-02-19 RX ORDER — HYDROMORPHONE HYDROCHLORIDE 4 MG/1
4 TABLET ORAL
Status: DISCONTINUED | OUTPATIENT
Start: 2021-02-19 | End: 2021-02-21 | Stop reason: HOSPADM

## 2021-02-19 RX ORDER — NEOSTIGMINE METHYLSULFATE 1 MG/ML
INJECTION, SOLUTION INTRAVENOUS AS NEEDED
Status: DISCONTINUED | OUTPATIENT
Start: 2021-02-19 | End: 2021-02-19 | Stop reason: HOSPADM

## 2021-02-19 RX ORDER — DEXTROSE 50 % IN WATER (D50W) INTRAVENOUS SYRINGE
12.5-25 AS NEEDED
Status: DISCONTINUED | OUTPATIENT
Start: 2021-02-19 | End: 2021-02-21 | Stop reason: HOSPADM

## 2021-02-19 RX ORDER — ONDANSETRON 2 MG/ML
INJECTION INTRAMUSCULAR; INTRAVENOUS AS NEEDED
Status: DISCONTINUED | OUTPATIENT
Start: 2021-02-19 | End: 2021-02-19 | Stop reason: HOSPADM

## 2021-02-19 RX ORDER — PRAVASTATIN SODIUM 20 MG/1
10 TABLET ORAL
Status: DISCONTINUED | OUTPATIENT
Start: 2021-02-19 | End: 2021-02-21 | Stop reason: HOSPADM

## 2021-02-19 RX ORDER — MAGNESIUM SULFATE 100 %
4 CRYSTALS MISCELLANEOUS AS NEEDED
Status: DISCONTINUED | OUTPATIENT
Start: 2021-02-19 | End: 2021-02-21 | Stop reason: HOSPADM

## 2021-02-19 RX ORDER — HYDROMORPHONE HYDROCHLORIDE 1 MG/ML
0.2 INJECTION, SOLUTION INTRAMUSCULAR; INTRAVENOUS; SUBCUTANEOUS
Status: DISCONTINUED | OUTPATIENT
Start: 2021-02-19 | End: 2021-02-21 | Stop reason: HOSPADM

## 2021-02-19 RX ORDER — ACETAMINOPHEN 325 MG/1
325 TABLET ORAL EVERY 4 HOURS
Status: DISCONTINUED | OUTPATIENT
Start: 2021-02-19 | End: 2021-02-21 | Stop reason: HOSPADM

## 2021-02-19 RX ORDER — SODIUM CHLORIDE 9 MG/ML
INJECTION, SOLUTION INTRAVENOUS
Status: DISCONTINUED | OUTPATIENT
Start: 2021-02-19 | End: 2021-02-19 | Stop reason: HOSPADM

## 2021-02-19 RX ORDER — LEVOTHYROXINE SODIUM 88 UG/1
88 TABLET ORAL
Status: DISCONTINUED | OUTPATIENT
Start: 2021-02-19 | End: 2021-02-21 | Stop reason: HOSPADM

## 2021-02-19 RX ORDER — SODIUM CHLORIDE 9 MG/ML
25 INJECTION, SOLUTION INTRAVENOUS CONTINUOUS
Status: DISCONTINUED | OUTPATIENT
Start: 2021-02-19 | End: 2021-02-20

## 2021-02-19 RX ORDER — HYDROMORPHONE HYDROCHLORIDE 4 MG/1
4 TABLET ORAL
Qty: 15 TAB | Refills: 0 | Status: SHIPPED | OUTPATIENT
Start: 2021-02-19 | End: 2021-02-22

## 2021-02-19 RX ORDER — HYDROMORPHONE HCL/0.9% NACL/PF 0.5 MG/ML
PLASTIC BAG, INJECTION (ML) INTRAVENOUS
Status: DISCONTINUED | OUTPATIENT
Start: 2021-02-19 | End: 2021-02-19

## 2021-02-19 RX ORDER — CETIRIZINE HCL 10 MG
10 TABLET ORAL EVERY MORNING
Status: DISCONTINUED | OUTPATIENT
Start: 2021-02-19 | End: 2021-02-21 | Stop reason: HOSPADM

## 2021-02-19 RX ORDER — VERAPAMIL HYDROCHLORIDE 240 MG/1
240 TABLET, FILM COATED, EXTENDED RELEASE ORAL
Status: DISCONTINUED | OUTPATIENT
Start: 2021-02-19 | End: 2021-02-21 | Stop reason: HOSPADM

## 2021-02-19 RX ORDER — PROMETHAZINE HYDROCHLORIDE 12.5 MG/1
25 TABLET ORAL
Qty: 30 TAB | Refills: 1 | Status: SHIPPED | OUTPATIENT
Start: 2021-02-19

## 2021-02-19 RX ORDER — ONDANSETRON 2 MG/ML
4 INJECTION INTRAMUSCULAR; INTRAVENOUS
Status: DISCONTINUED | OUTPATIENT
Start: 2021-02-19 | End: 2021-02-21 | Stop reason: HOSPADM

## 2021-02-19 RX ORDER — BUPIVACAINE HYDROCHLORIDE AND EPINEPHRINE 5; 5 MG/ML; UG/ML
INJECTION, SOLUTION EPIDURAL; INTRACAUDAL; PERINEURAL AS NEEDED
Status: DISCONTINUED | OUTPATIENT
Start: 2021-02-18 | End: 2021-02-19 | Stop reason: HOSPADM

## 2021-02-19 RX ORDER — INSULIN LISPRO 100 [IU]/ML
4 INJECTION, SOLUTION INTRAVENOUS; SUBCUTANEOUS ONCE
Status: COMPLETED | OUTPATIENT
Start: 2021-02-19 | End: 2021-02-19

## 2021-02-19 RX ORDER — SODIUM CHLORIDE 0.9 % (FLUSH) 0.9 %
5-40 SYRINGE (ML) INJECTION EVERY 8 HOURS
Status: DISCONTINUED | OUTPATIENT
Start: 2021-02-19 | End: 2021-02-21 | Stop reason: HOSPADM

## 2021-02-19 RX ADMIN — HYDROMORPHONE HYDROCHLORIDE 0.25 MG: 2 INJECTION, SOLUTION INTRAMUSCULAR; INTRAVENOUS; SUBCUTANEOUS at 00:56

## 2021-02-19 RX ADMIN — FENTANYL CITRATE 25 MCG: 50 INJECTION, SOLUTION INTRAMUSCULAR; INTRAVENOUS at 02:40

## 2021-02-19 RX ADMIN — Medication 10 ML: at 22:00

## 2021-02-19 RX ADMIN — PRAVASTATIN SODIUM 10 MG: 20 TABLET ORAL at 21:36

## 2021-02-19 RX ADMIN — FENTANYL CITRATE 25 MCG: 50 INJECTION, SOLUTION INTRAMUSCULAR; INTRAVENOUS at 02:50

## 2021-02-19 RX ADMIN — VERAPAMIL HYDROCHLORIDE 240 MG: 240 TABLET, FILM COATED, EXTENDED RELEASE ORAL at 21:34

## 2021-02-19 RX ADMIN — SODIUM CHLORIDE: 900 INJECTION, SOLUTION INTRAVENOUS at 01:30

## 2021-02-19 RX ADMIN — HYDROMORPHONE HYDROCHLORIDE 4 MG: 4 TABLET ORAL at 16:35

## 2021-02-19 RX ADMIN — FENTANYL CITRATE 25 MCG: 50 INJECTION, SOLUTION INTRAMUSCULAR; INTRAVENOUS at 02:45

## 2021-02-19 RX ADMIN — ACETAMINOPHEN 325 MG: 325 TABLET ORAL at 10:11

## 2021-02-19 RX ADMIN — GLYCOPYRROLATE 0.2 MG: 0.2 INJECTION, SOLUTION INTRAMUSCULAR; INTRAVENOUS at 01:43

## 2021-02-19 RX ADMIN — HYDROMORPHONE HYDROCHLORIDE: 10 INJECTION, SOLUTION INTRAMUSCULAR; INTRAVENOUS; SUBCUTANEOUS at 03:52

## 2021-02-19 RX ADMIN — ACETAMINOPHEN 325 MG: 325 TABLET ORAL at 19:16

## 2021-02-19 RX ADMIN — Medication 2 MG: at 01:43

## 2021-02-19 RX ADMIN — INSULIN ASPART 100 UNITS: 100 INJECTION, SOLUTION INTRAVENOUS; SUBCUTANEOUS at 16:00

## 2021-02-19 RX ADMIN — ONDANSETRON HYDROCHLORIDE 4 MG: 2 INJECTION, SOLUTION INTRAMUSCULAR; INTRAVENOUS at 00:52

## 2021-02-19 RX ADMIN — PROPOFOL 100 MG: 10 INJECTION, EMULSION INTRAVENOUS at 02:01

## 2021-02-19 RX ADMIN — SODIUM CHLORIDE 25 ML/HR: 9 INJECTION, SOLUTION INTRAVENOUS at 04:34

## 2021-02-19 RX ADMIN — Medication 160 MCG: at 01:11

## 2021-02-19 RX ADMIN — ACETAMINOPHEN 325 MG: 325 TABLET ORAL at 16:10

## 2021-02-19 RX ADMIN — FENTANYL CITRATE 25 MCG: 50 INJECTION, SOLUTION INTRAMUSCULAR; INTRAVENOUS at 02:55

## 2021-02-19 RX ADMIN — INSULIN LISPRO 4 UNITS: 100 INJECTION, SOLUTION INTRAVENOUS; SUBCUTANEOUS at 16:10

## 2021-02-19 RX ADMIN — WATER 2 G: 1 INJECTION INTRAMUSCULAR; INTRAVENOUS; SUBCUTANEOUS at 00:00

## 2021-02-19 RX ADMIN — LEVOTHYROXINE SODIUM 88 MCG: 0.09 TABLET ORAL at 06:25

## 2021-02-19 NOTE — PROGRESS NOTES
Physical Therapy    Orders acknowledged and chart reviewed in prep for PT evaluation. At patient's request, am waiting for wife to return to hospital before starting PT.   Jackeline Machado

## 2021-02-19 NOTE — PROGRESS NOTES
Foot and Ankle Surgery Progress Note    Patient: Mariela Nelson MRN: [id number]  SSN: [id number]    YOB: 1945  Age: 70 y.o. Sex: male      Admit Date: 2021    1 Day Post-Op    Procedure:  Procedure(s):  CHARCOT RECONSTRUCTION OF THE LEFT FOOT WITH MUTIPLE FUSIONS, ACHILLES TENDON LENGTHENING, APPLY EXTERNAL FIXATION LEFT FOOT  ACHILLES TENDON REPAIR    Subjective:   Pt seen at bedside this AM.   Patient has no complaints. Objective:     Visit Vitals  /66 (BP 1 Location: Right upper arm, BP Patient Position: At rest)   Pulse 88   Temp 97.6 °F (36.4 °C)   Resp 14   Ht 6' 1\" (1.854 m)   Wt 77.1 kg (170 lb)   SpO2 98%   BMI 22.43 kg/m²       Temp (24hrs), Av.8 °F (36.6 °C), Min:97.4 °F (36.3 °C), Max:98.5 °F (36.9 °C)      Physical Exam:    GENERAL: alert, cooperative, no distress, appears stated age  Left lower extremity with external fixator in place. Dressing is c/d/i. Brisk cap refill x5 digits. Lab Review:   BMP: No results found for: NA, K, CL, CO2, AGAP, GLU, BUN, CREA, GFRAA, GFRNA  CBC: No results found for: WBC, HGB, HGBEXT, HCT, HCTEXT, PLT, PLTEXT, HGBEXT, HCTEXT, PLTEXT    Assessment:     Hospital Problems  Never Reviewed          Codes Class Noted POA    Charcot ankle, left ICD-10-CM: R94.565  ICD-9-CM: 094.0, 713.5  2021 Unknown        Charcot's joint of left foot ICD-10-CM: O49.617  ICD-9-CM: 094.0, 713.5  2021 Unknown              Plan/Recommendations/Medical Decision MakinM with hx of neuropathy and Charcot is POD1 s/p left charcot foot reconstruction with external fixator application. Pain is well controlled this AM. Will transition from PCA to PO pain meds. He is to work with PT this am and likely be discharged by afternoon. Plan: transition from IV to PO pain meds, work with PT, and discharge to home later today.       Signed By: Jessika Dean DPM     2021

## 2021-02-19 NOTE — PROGRESS NOTES
Bedside shift change report given to [address] [place] [address] [address] (oncoming nurse) by Lyla Shen (offgoing nurse). Report included the following information SBAR, Kardex, Intake/Output and MAR.

## 2021-02-19 NOTE — PROGRESS NOTES
Primary Nurse Mario Workman RN and Nico Fox RN performed a dual skin assessment on this patient; No impairment noted.  Cristobal score is 21

## 2021-02-19 NOTE — DIABETES MGMT
[facility]    CLINICAL NURSE SPECIALIST CONSULT   INITIAL NOTE    Initial Presentation   Jade Adams is a 70 y.o. male admitted for surgical management of charcot foot . HX:   Past Medical History:   Diagnosis Date    Arthritis     Atrial flutter ([address]     CARDIOVERSION 2014,  ABLATION    Borderline hypothyroidism     Chronic kidney disease     stage 3a per nephrology notes    Diabetes type 1, controlled ([address]     Patient has insulin pump AND CGM MONITOR    History of cardioversion October 2014    Hypertension     Hyponatremia     per nephrology notes    PVC (premature ventricular contraction)     S/P ablation of atrial flutter March 2016    Secondary hyperparathyroidism of renal origin ([facility] [address] [facility]     per nephrology notes        DX: CHARCOT LEFT FOOT    TX: CHARCOT RECONSTRUCTION OF THE LEFT FOOT WITH MUTIPLE FUSIONS,  Repair of the posterior tibial tendon and anterior tibial tendon,  APPLY EXTERNAL FIXATION LEFT FOOT    Clinical Course   Current course has been COMPLICATED by hyperglycemia    Diabetes    Patient has known Type 1 diabetes, treated with Novolog insulin pump PTA.     Admission  and unknown A1C of 7.5% in January indicate near goal diabetes management  Consulted by Provider for advanced diabetes nursing assessment and care, specifically related to   [x] Inpatient management strategy    Diabetes-related medical history  Acute complications: hyperglycemia  Neurological complications  Peripheral neuropathy  Microvascular disease  Macrovascular disease  Foot wounds  Other associated conditions         Diabetes medication history  Drug class Currently in use Discontinued Never used   Biguanide      DDP-4 inhibitor       Sulfonylurea      Thiazolidinedione      GLP-1 RA      SGLT-2 inhibitors      Basal insulin Medtronic 670 Insulin Pump  12a-6a: 0.55 units/hour  6a-6p: 0.7 units/hour  6p-12a: 0.65 units/hour     Bolus insulin Medtronic 670 Insulin Pump  Carb coverage 1:10  Sensitivity 50  Goal      Fixed Dose  Combinations        Subjective   I've given bolus insulin but blood sugar is not responding\"     Diagnosed with DM in 1973  Lives at home with wife  Sees Dr. Brenda Pereyra endocrinologist for diabetes management  Started on insulin pump in 2018- A1C ranges from 6.6-7.2%   Changes site every 2-3 days  Wears CGM and uses auto mode at home    Patient reports the following home diabetes self-care practices:  Eating pattern  [x] Low carb at home  Physical activity pattern  [x] Limited due to foot pain  Monitoring pattern  [] CGM at home- uploads results every week  Taking medications pattern  [x] Consistent administration  [x] Affordable    Received 8mg Decadron at 1500 in OR yesterday  Arrived on unit at 0400  Started on regular diet at breakfast this am  Blood glucose rising from 253 at 0643 to 327 at 1130, Pt's pump also reads 312- pt has bolus'd insulin for rising blood glucose but has not seen response in blood sugar. Rechecked        Objective   Physical exam  General Alert, oriented and in no acute distress Conversant and cooperative. Vital Signs   Visit Vitals  /65 (BP 1 Location: Right upper arm, BP Patient Position: At rest)   Pulse 83   Temp 97.7 °F (36.5 °C)   Resp 14   Ht 6' 1\" (1.854 m)   Wt 77.1 kg (170 lb)   SpO2 99%   BMI 22.43 kg/m²     Skin  Warm and dry. No acanthosis noted along neckline. No lipohypertrophy or lipoatrophy noted at injection sites   Heart   Regular rate and rhythm.  No murmurs, rubs or gallops  Lungs  Clear to auscultation without rales or rhonchi  Extremities Left foot in surgical dressing and external fixation device        Laboratory  All labs reviewed prior to seeing patient    Factors impacting BG management  Factor Dose Comments   Nutrition:  Carb-controlled meals     60 grams/meal   Currently on regular diet   Drugs:    Steroids     8mg in OR at 1500 2/18   Duration of steroid on glucose 24 hours   Pain Post-operative pain       Blood glucose pattern        Assessment and Plan   Nursing Diagnosis Risk for unstable blood glucose pattern   Nursing Intervention Domain 7914 Decision-making Support   Nursing Interventions Examined current inpatient diabetes control   Explored factors facilitating and impeding inpatient management  Identified self-management practices impeding diabetes control  Explored corrective strategies with patient and responsible inpatient provider   Informed patient of rational for insulin strategy while hospitalized  Instructed patient in inpatient pump policy, use of hospital glucometer and inpatient blood glucose goals     Evaluation   Stefano Steele is a 74 yo male with history of type 1 DM on Medtronic 670 insulin pump PTA. Admission BG of 160 and A1C of 7.5% indicate near goal glucose control. Now s/p surgical reconstruction of charcot left foot. Pt received a one time dose of 8mg Decadron at 1500 in the OR and has experienced hyperglycemia despite appropriate bolus doses with insulin pump. This is likely due to a  pump site failure and pt will replace site when his wife returns with supplies from home within the hour. Will recheck blood glucose for response from most recent bolus insulin delivered from pump and most likely will dose with one time subcutaneous basal dose. Appropriate for pt to continue to use insulin pump in inpatient setting as he has had success with glucose control on the pump in the past. Pt has agreed to inpatient insulin pump protocol.     Update at 1545: Blood glucose at 1500 294 and pt tested urine with keto strips indicating moderate ketones. Will give subcutaneous bolus insulin at this time and change insulin pump site.     Recommendations   Keep patient on insulin pump with current manual settings (home dose)    If BG still elevated at 1500 today despite insulin pump bolus at 1200: give one time dose of Humalog 1 unit for every 50 over 120- Give 4 units Novolog x  one at this time.      If patient needs to d/c insulin pump for any reason backup insulin doses:   Basal: 8 units NPH BID  Bolus: 1 unit Humalog for every 10 grams carbohydrates with meals  Correctional: 1 unit for every 50 over 120  Billing Code(s)   65 minutes      Jeremiah Truong  Diabetes Clinical Nurse Specialist  [facility]  Access via [address] [place] [address]

## 2021-02-19 NOTE — ANESTHESIA POSTPROCEDURE EVALUATION
Procedure(s):  CHARCOT RECONSTRUCTION OF THE LEFT FOOT WITH MUTIPLE FUSIONS, ACHILLES TENDON LENGTHENING, APPLY EXTERNAL FIXATION LEFT FOOT  ACHILLES TENDON REPAIR. general    <BSHSIANPOST>    INITIAL Post-op Vital signs:   Vitals Value Taken Time   BP 91/44 02/19/21 0300   Temp 36.6 °C (97.9 °F) 02/19/21 0231   Pulse 71 02/19/21 0303   Resp 24 02/19/21 0303   SpO2 100 % 02/19/21 0303   Vitals shown include unvalidated device data.

## 2021-02-19 NOTE — DISCHARGE INSTRUCTIONS
INSTRUCTIONS FOLLOWING FOOT SURGERY    ACTIVITY:  · Elevate feet for 48 hours  · Use ice as directed by your doctor  · Use crutches / walker as directed by your doctor, and follow your doctors instructions as to your weight bearing status: LEFT FOOT NO WEIGHT BEARING. DIET:  · Clear liquids until no nausea or vomiting; then light diet for the first day  · An advance to regular diet on second day, unless your doctor orders otherwise. · PLEASE RESTART ELIQUIS 2/20/21. PAIN:  · Take pain medication as directed by your doctor. · Call your doctor if pain is not relieved by medication. · Do not take aspirin or blood thinners until directed by your doctor. DRESSING CARE: keep dressing clean and dry, do not remove       FOLLOW-UP PHONE CALLS:  · Calls will be made by nursing staff. · If you had any problems, call your doctor as needed. CALL YOUR DOCTOR IF:  · Excessive bleeding that does not stop after holding mild pressure over the area  · Temperature of 101° F or above  · Redness, excessive swelling or bruising, and/or green or yellow, smelly discharge from incision  · Loss of sensation -cold, white, or blue toes    AFTER ANESTHESIA :   · For the first 24 hours: do not drive, drink alcoholic beverages, or make important decisions. · Be aware of dizziness following anesthesia and while taking pain medication. DISCHARGE MEDICATIONS: {Medication reconciliation information is now added to the patient's AVS automatically when it is printed. There is no need to use this SmartLink in discharge instructions.   Highlight this text and delete it to clear this message}        APPOINTMENT DATE/TIME: please call for an appointment    YOUR DOCTORS PHONE NUMBER: [phone]    Patients signature:  Date: 2/19/2021  Discharging Nurse:

## 2021-02-19 NOTE — OP NOTES
[facility]  OPERATIVE REPORT    Name:  Nicole Carver  MR#:  [id number]  :  1945  ACCOUNT #:  [de-identified]  DATE OF SERVICE:  2021    PREOPERATIVE DIAGNOSIS:  Left foot Charcot reconstruction. POSTOPERATIVE DIAGNOSIS:  Left foot Charcot reconstruction. PROCEDURES PERFORMED:  1. Charcot reconstruction with multiple fusions including the first metatarsal cuneiform, second and third metatarsal cuneiforms, fourth metatarsal cuboid, fifth metatarsal cuboid, calcaneal cuboid, cuneiform navicular and talonavicular fusions. 2.  Repair of anterior tibial tendon, repair of posterior tibial tendon, and application of external fixator. SURGEON:  Joseph Garcia DPM    ASSISTANT:  Kaleta Brittle, DPM - Fellow    ANESTHESIA:  General.    COMPLICATIONS:  None. SPECIMENS REMOVED:  Pathology was none. IMPLANTS:  Medartis and Biomet screws and external fixator, biologics were demineralized bone matrix. ESTIMATED BLOOD LOSS:  400 mL. DRAINS:  #7 Flat drain also. INDICATIONS:  The patient has a Charcot foot with rocker bottom. We discussed the surgery, the risks, the complications, the alternatives of the procedure including CROW boots, living with it, and the procedure itself. He is aware and agreeable. PROCEDURE:  He was taken to the operating room and placed on the operating table in supine position. After adequate induction of general anesthesia, the patient was prepped and draped in the usual sterile manner. I made incisions along the medial and lateral aspects of the foot and carried it down to the level of the superficial fascia and then level of bone. The anterior tibial tendon was pulling the cuneiform up and was almost impossible to correct, so we resected the anterior tibial tendon off the cuneiform.   We also had to resect the posterior tibial tendon off the navicular, because there was pulling of navicular medial.  We then got down to the level of bone.  We resected the bone surfaces for the talus and the navicular, the navicular and the cuneiforms, the cuneiform and the first metatarsal base, the fourth and fifth metatarsal bases and the cuboid. After the joint resection we fenestrated the surfaces with a 2mm drill and fish scaled with an osteotome  We removed the second and third cuneiforms completely as they were protruding to the plantar aspect of the foot and we were able to leanna up the cuboid from the soft tissues and then resected the anterior aspect to allow for the fusion. Once all that was accomplished, we performed a V osteotomy of the first metatarsal, shifted the head in lateral direction and threw an axial 7.0 headed screw down the pipe pulling the first metatarsal into the cuneiform, into the navicular and into the talus. Once all that was locked in, we created the cortical cancellous chips from the cuneiforms, resecting the Charcot bone and fibrous tissues and mixed it with demineralized bone matrix and cortical cancellous chips to fill in where the cuneiforms were. We placed a 5.0 screw across the fifth metatarsal into the cuboid, but that was fracturing in the metatarsal.  We threw 3.0 screws from the second metatarsal into the navicular through the bone mass that we created with the bone graft and debrided cuneiforms. We threw a 3.0 through the third metatarsal and two 3.0s in the fourth and fifth metatarsals, holding the lateral column in good position. Once that was all accomplished, we placed a two 4.0 screws into the calcaneal cuboid joint creating an arthrodesis site at the calcaneal cuboid as well. It should be noted that the calcaneal cuboid joint was also resected. Once all that was accomplished, we placed the tendon anchor into the medial aspect of the navicular and into the medial aspect of the medial cuneiform.   We placed the anterior tibial tendon into the cuneiform and the posterior tibial tendon into the navicular, recreating the physiologic tension. Once this was accomplished, the deep structures reapproximated with 3-0 Vicryl, superficial fascia with 3-0 Vicryl, skin was reapproximated with 4-0 nylon. We were concerned about the drainage, so we popped some of the stitches and placed a 7 flat SENA drain to allow to protect the patient from developing hematoma postoperatively. At this point, we placed the patient into an external fixator. We drilled 2 wires into the calcaneus, 2 into the forefoot and 2 into the midfoot holding in place. There were skinny wires on the 2 wings in the tibia, one above and one below holding everything in place. X-ray showed a good approximation of the bone, good fixation on both AP, oblique and lateral x-rays. Clinically, the bulge on the bottom of the foot that was present was not hard, it was prominant from the previous bone protrusion; however, this was now soft and appeared to be got in good position of the patient and corrected his Charcot deformity. He left the operating room to recovery room in stable condition.         JOANNA Alatorre_ANA/BC_XRT  D:  02/19/2021 2:51  T:  02/19/2021 14:37  JOB #:  [id number]

## 2021-02-19 NOTE — PROGRESS NOTES
ADITHYA: d/c home with family support; Follow up with Podiatry Specialist and Pcp; Therapy recommendations pending; Pt has knee scooter and crutches; Spouse to provide d/c transport    RUR: n/a  Reason for Admission:   S/p left foot reconstruction with fusions and achilles tendon repair                   RUR Score:      N/a                Plan for utilizing home health:      No HH needs at this time     PCP: First and Last name:  Dr. Roberta Whyte    Name of Practice:    Are you a current patient: Yes/No: YES   Approximate date of last visit: 3 weeks    Can you participate in a virtual visit with your PCP:                   YES  Current Advanced Directive/Advance Care Plan:   No amd on file   Healthcare Decision Maker:                         Transition of Care Plan:     1000-CM reviewed pt chart & met with pt and spouse at bedside to discuss transitional planning. Pt resides with spouse in a two story home with five steps to entrance. Home has 2nd floor bedroom with approx. 13 steps to access. Prior to admission, pt was independent with adls, dme is: crutches, shower bench, elevated toilet seat, knee scooter, insulin pump and glucometer. Pt uses [facility] pharmacy for meds with no copay concerns. Spouse to provide d/c transport. CM discuss HH for SENA drain management and pt and spouse advised that they are both familiar with SENA drain management and declined Home Health. Therapy recommendations pending. Pt denied prior [place] or rehab placement. CM confirmed PCP and demographics. CM to follow for transitions of care. Milton Oreilly RN BSN CCM  Observation notice provided in writing to patient and/or caregiver as well as verbal explanation of the policy. Patients who are in outpatient status also receive the Observation notice. Care Management Interventions  PCP Verified by CM: Yes  Palliative Care Criteria Met (RRAT>21 & CHF Dx)?: No  Mode of Transport at Discharge:  Other (see comment)(spouse )  Transition of Care Consult (CM Consult): Discharge Planning  MyChart Signup: Yes  Discharge Durable Medical Equipment: No(PT OWNS CRUTCHES, SHOWER BENCH,ELEVATED TOILET SEAT, KNEE SCOOTER, INSULIN PUMP AND GLUCOMETER )  Health Maintenance Reviewed: Yes  Current Support Network: Lives with Spouse, Own Home  Confirm Follow Up Transport: Family  The Plan for Transition of Care is Related to the Following Treatment Goals : SURGICAL, MEDICAL AND THERAPY CLEARANCE   Discharge Location  Discharge Placement: Home with family assistance  Jair Toledo RN BSN CCM

## 2021-02-19 NOTE — PROGRESS NOTES
TRANSFER - OUT REPORT:    Verbal report given to Aileen abdi(name) on Stefano Steele  being transferred to 561(unit) for routine progression of care       Report consisted of patient’s Situation, Background, Assessment and   Recommendations(SBAR).     Time Pre op antibiotic given:0200  Anesthesia Stop time: 0230  Jimenez Present on Transfer to floor:n  Order for Jimenez on Chart:n  Discharge Prescriptions with Chart:    Information from the following report(s) SBAR, Kardex, OR Summary and Procedure Summary was reviewed with the receiving nurse.    Opportunity for questions and clarification was provided.     Is the patient on 02? YES       L/Min 2       Other     Is the patient on a monitor? NO    Is the nurse transporting with the patient? NO    Surgical Waiting Area notified of patient's transfer from PACU? YES      The following personal items collected during your admission accompanied patient upon transfer:   Dental Appliance: Dental Appliances: None  Vision: Visual Aid: Glasses  Hearing Aid:    Jewelry:    Clothing: Clothing: (to pacu)  Other Valuables: Other Valuables: Eyeglasses(mask and glasses to pacu)  Valuables sent to safe:      BAG OF CLOTHES

## 2021-02-19 NOTE — PROGRESS NOTES
Patient complained of a knot on top of head that was not present prior to admission after returning to the floor from surgery. I assessed the knot, patient stated it is not painful at this time. I will continue to monitor the knot during my shift and will relay this information to day shift.

## 2021-02-19 NOTE — PROGRESS NOTES
Problem: Falls - Risk of  Goal: *Absence of Falls  Description: Document Sury Dalton Fall Risk and appropriate interventions in the flowsheet.   Outcome: Progressing Towards Goal  Note: Fall Risk Interventions:  Mobility Interventions: Assess mobility with egress test, Communicate number of staff needed for ambulation/transfer, Patient to call before getting OOB, PT Consult for mobility concerns, PT Consult for assist device competence, Utilize walker, cane, or other assistive device, Utilize gait belt for transfers/ambulation         Medication Interventions: Assess postural VS orthostatic hypotension, Evaluate medications/consider consulting pharmacy, Patient to call before getting OOB, Teach patient to arise slowly, Utilize gait belt for transfers/ambulation    Elimination Interventions: Call light in reach, Patient to call for help with toileting needs, Toileting schedule/hourly rounds, Urinal in reach              Problem: Pain  Goal: *Control of Pain  Outcome: Progressing Towards Goal  Note: PCA discontinued, po meds started     Problem: Patient Education: Go to Patient Education Activity  Goal: Patient/Family Education  Outcome: Progressing Towards Goal     Problem: Diabetes Maintenance:Admission  Goal: Diagnostic Tests/Procedures  Outcome: Progressing Towards Goal  Goal: Nutrition  Outcome: Progressing Towards Goal  Goal: Medications  Outcome: Progressing Towards Goal  Goal: Treatments/Interventions/Procedures  Outcome: Progressing Towards Goal  Note: Diabetes treatment center consulted for insulin pump     Problem: Surgical Pathway Post-Op Day 1  Goal: Activity/Safety  Outcome: Progressing Towards Goal  Goal: Diagnostic Test/Procedures  Outcome: Progressing Towards Goal  Note: Has PT orders  Goal: Nutrition/Diet  Outcome: Progressing Towards Goal  Note: Advanced to regular diet  Goal: Discharge Planning  Outcome: Progressing Towards Goal  Note: Waiting for PT clearance  Goal: Respiratory  Outcome: Progressing Towards Goal  Note: Instructed on use of incentive spirometer, encouraged to use 10x/hr while awake  Goal: Psychosocial  Outcome: Progressing Towards Goal  Note: Wife at bedside for support

## 2021-02-19 NOTE — PROGRESS NOTES
Bedside shift change report given to Servando Gallegos (oncoming nurse) by Erenest Romberg, RN (offgoing nurse).  Report included the following information SBAR

## 2021-02-19 NOTE — BRIEF OP NOTE
Brief Postoperative Note    Patient: Zaira Gardner  YOB: 1945  MRN: [id number]    Date of Procedure: 2/18/2021     Pre-Op Diagnosis: CHARCOT LEFT FOOT    Post-Op Diagnosis: Same as preoperative diagnosis. Procedure(s):  CHARCOT RECONSTRUCTION OF THE LEFT FOOT WITH MUTIPLE FUSIONS,  Repair of the posterior tibial tendon and anterior tibial tendon,  APPLY EXTERNAL FIXATION LEFT FOOT      Surgeon(s):  JOANNA Sandhu DPM    Surgical Assistant: None    Anesthesia: General     Estimated Blood Loss (mL): 603    Complications: None    Specimens: * No specimens in log *     Implants:   Implant Name Type Inv. Item Serial No.  Lot No. LRB No. Used Action   GRAFT BNE SUB 10ML DEMIN BNE MTRX PROGENIX + - [id number]  GRAFT BNE SUB 10ML DEMIN BNE MTRX PROGENIX + [id number] [employer] SPINALGRAFT TECH_WD NA Left 1 Implanted   SCREW SYS DUALTHRD HDLSS COMPR TI THRD 81LX ERLINDA 20MM - SNA  SCREW SYS DUALTHRD HDLSS COMPR TI THRD 94LN ERLINDA 20MM NA [employer]WD NA Left 1 Implanted   SCREW Screw  NA [employer] NA Left 1 Implanted   ANCHOR SUTURE BIOCOMP 3X14.5 MM FIBERWIRE 2 TIGERWIRE - SNA  ANCHOR SUTURE BIOCOMP 3X14.5 MM FIBERWIRE 2 TIGERWIRE NA [employer]_WD [id number] Left 2 Implanted   3.0 SCREW Screw  NA [employer] NA Left 1 Implanted   3.0 SCREW Screw  NA [employer] NA Left 1 Implanted   3.0 SCREW Screw  NA [employer] NA Left 2 Implanted   3.0 SCREW Screw  NA [employer] NA Left 1 Implanted   4.0 SCREW Screw  NA [employer] NA Left 1 Implanted   4.0 SCREW Screw  NA [employer] NA Left 1 Implanted    4.0 SCREW Screw  NA [employer]  NA Left 1 Implanted       Drains:   Melissa Lira #1 02/19/21 Right Foot (Active)       Findings: Charcot deformity    Electronically Signed by Aba Valencia DPM on 2/19/2021 at 2:32 AM

## 2021-02-19 NOTE — PROGRESS NOTES
Problem: Mobility Impaired (Adult and Pediatric)  Goal: *Acute Goals and Plan of Care (Insert Text)  Description: FUNCTIONAL STATUS PRIOR TO ADMISSION: Patient was independent and active without use of DME.    HOME SUPPORT PRIOR TO ADMISSION: The patient lived with wife but did not require assist.    Physical Therapy Goals  Initiated 2/19/2021  1. Patient will move from supine to sit and sit to supine , scoot up and down, and roll side to side in bed with modified independence within 7 day(s). 2.  Patient will transfer from bed to chair and chair to bed with modified independence using the least restrictive device within 7 day(s). 3.  Patient will perform sit to stand with modified independence within 7 day(s). 4.  Patient will ambulate with modified independence for 10 feet with the least restrictive device within 7 day(s). 5.  Patient will ascend/descend 5 stairs with crutch and one handrail(s) OR bumping up on his buttocks with minimal assistance/contact guard assist within 7 day(s). Outcome: Progressing Towards Goal   PHYSICAL THERAPY EVALUATION  Patient: Sylwia Rush (89 y.o. male)  Date: 2/19/2021  Primary Diagnosis: Charcot's joint of left foot [M14.672]  Charcot ankle, left [M14.672]  Procedure(s) (LRB):  CHARCOT RECONSTRUCTION OF THE LEFT FOOT WITH MUTIPLE FUSIONS, ACHILLES TENDON LENGTHENING, APPLY EXTERNAL FIXATION LEFT FOOT (Left)  ACHILLES TENDON REPAIR (Left) 1 Day Post-Op   Precautions:  Fall, NWB(NWB'ing LLE)    ASSESSMENT  Based on the objective data described below, the patient presents with  impairment in functional mobility, activity tolerance and balance s/p Left Foot reconstruction with external fixator, POD#1. PLOF: Independent with ADLs and IADLs. Patient lives with wife in a 2 story home. There are 5 steps with bilateral rails to enter (not close enough to hold onto both) and 15 steps with one rail up to bedrooms and bathrooms on second floor.  Downstairs has a small half-bath with sink and toilet. Patient has a knee scooter (which will probably not be feasible given the external fixator), his wife's old crutches (probably not tall enough) and a shower seat. Patient's nerve block has not worn off, therefore pain can not be assessed. (Discussed situation with weekend PTA). Wife present for session. Spent most of this visit after assessment discussing mobility possibilities & options/equipment possibilities and options. [address] status with heavy fixator requires great balance and a lot of energy expenditure. Equipment can not be ordered over the weekend (except for hospital crutches), but wife can purchase or rent what is needed. Gave names of possible vendors. Final needs will be determined after PT session tomorrow (and Sunday if still here). Possibilities might be as follows: 1) If patient is able to ambulate with RW and can safely bump up the stairs on his buttocks and get back up, wife can purchase RW & bedside commode ([employer] or [employer]). 2) If patient does extremely well tomorrow and can safely use crutches on stairs, provide with (hospital) crutches. 3) If patient is unable to negotiate stairs and can only transfer or walk 3-4 ft, recommend that wife rent a wheelchair (she has the details) and a ramp from [address] [place] [address], purchase RW and bedside commode. Current Level of Function Impacting Discharge (mobility/balance): Contact guard assistance (some assist to manage fixator) for bed mobility and transfers (sit-stand-sit). Patient attempted a few steps with RW and gait belt, took some very small hopping steps with RW while maintaining NWB'ing status, but fatigued very quickly. Was able to scoot-pivot to head of bed with RW and contact guard. May be able to progress further tomorrow when feeling better. Functional Outcome Measure:   The patient scored 55/100 on the Barthel outcome measure which is indicative of moderate impaired ability to care for basic self-needs/dependency on others. Other factors to consider for discharge: [address] LLE with external fixator/Steps to enter the home/Steps to second floor/Motivated/A & O x 4/Supportive Family/Independent PLOF      Patient will benefit from skilled therapy intervention to address the above noted impairments. PLAN :  Recommendations and Planned Interventions: bed mobility training, transfer training, gait training, patient and family training/education, and therapeutic activities      Frequency/Duration: Patient will be followed by physical therapy:  daily to address goals. Recommendation for discharge: (in order for the patient to meet his/her long term goals)  Physical therapy at least 2 days/week in the home     This discharge recommendation:  A follow-up discussion with the attending provider and/or case management is planned    IF patient discharges home will need the following DME: to be determined (TBD) Please see above narrative for details. SUBJECTIVE:   Patient stated I can wiggle my big toe. I am still feeling pretty weak.     OBJECTIVE DATA SUMMARY:   HISTORY:    Past Medical History:   Diagnosis Date    Arthritis     Atrial flutter ([facility]     CARDIOVERSION 2014,  ABLATION    Borderline hypothyroidism     Chronic kidney disease     stage 3a per nephrology notes    Diabetes type 1, controlled ([facility]     Patient has insulin pump AND CGM MONITOR    History of cardioversion October 2014    Hypertension     Hyponatremia     per nephrology notes    PVC (premature ventricular contraction)     S/P ablation of atrial flutter March 2016    Secondary hyperparathyroidism of renal origin [facility])     per nephrology notes     Past Surgical History:   Procedure Laterality Date    COLONOSCOPY N/A 6/21/2016    COLONOSCOPY performed by Elle Guaman MD at [address] [place] [address] N/A 10/23/2019    COLONOSCOPY performed by Malik Kuo MD at [facility] ENDOSCOPY    HX CATARACT REMOVAL Bilateral     HX COLONOSCOPY  2011    HX HEENT      LASER SURGERY AND VITRECTOMY OU    HX OTHER SURGICAL  March 2016    A-flutter ablation    HX OTHER SURGICAL      Penile implant    HX TONSILLECTOMY      VT CARDIAC SURG PROCEDURE UNLIST      CARDIAC ABLATION X 2 2018, 2020, CARDIOVERSON       Personal factors and/or comorbidities impacting plan of care: Motivated/A & O x 4/Supportive Family/Independent PLOF     Home Situation  Home Environment: Private residence  # Steps to Enter: 5  Rails to Enter: Yes  Hand Rails : Bilateral(not close enough to hold both)  Wheelchair Ramp: No  One/Two Story Residence: Two story  # of Interior Steps: 15  Interior Rails: Right  Lift Chair Available: No  Living Alone: No  Support Systems: Spouse/Significant Other/Partner  Patient Expects to be Discharged to[de-identified] Private residence  Current DME Used/Available at Home: Crutches, Shower chair(knee scooter)  Tub or Shower Type: Shower    EXAMINATION/PRESENTATION/DECISION MAKING:   Critical Behavior:  Neurologic State: Alert, Appropriate for age  Orientation Level: Oriented X4  Cognition: Appropriate decision making, Appropriate for age attention/concentration, Appropriate safety awareness, Follows commands     Hearing: Auditory  Auditory Impairment: None  Hearing Aids/Status: Does not own    Range Of Motion:  AROM: Generally decreased, functional           PROM: Generally decreased, functional           Strength:    Strength: Generally decreased, functional                    Tone & Sensation:   Tone: Normal              Sensation: Impaired               Coordination:  Coordination: Within functional limits  Vision:      Functional Mobility:  Bed Mobility:     Supine to Sit: Contact guard assistance  Sit to Supine: Contact guard assistance  Scooting: Contact guard assistance  Transfers:  Sit to Stand: Contact guard assistance  Stand to Sit: Contact guard assistance                       Balance:   Sitting: Intact  Standing: Impaired; With support  Standing - Static: Good;Constant support  Standing - Dynamic : Fair;Constant support  Ambulation/Gait Training:  Distance (ft): 3 Feet (ft)  Assistive Device: Walker, rolling;Gait belt  Ambulation - Level of Assistance: Contact guard assistance        Gait Abnormalities: Antalgic;Decreased step clearance;Trunk sway increased(Small hop step forward)     Left Side Weight Bearing: Non-weight bearing(external fixator)  Base of Support: Shift to right;Center of gravity altered     Speed/Dayna: Pace decreased (<100 feet/min)  Step Length: Right shortened        Interventions: Safety awareness training;Verbal cues       Functional Measure:  Barthel Index:    Bathin  Bladder: 10  Bowels: 10  Groomin  Dressin  Feeding: 10  Mobility: 0  Stairs: 0  Toilet Use: 5  Transfer (Bed to Chair and Back): 10  Total: 50/100       The Barthel ADL Index: Guidelines  1. The index should be used as a record of what a patient does, not as a record of what a patient could do. 2. The main aim is to establish degree of independence from any help, physical or verbal, however minor and for whatever reason. 3. The need for supervision renders the patient not independent. 4. A patient's performance should be established using the best available evidence. Asking the patient, friends/relatives and nurses are the usual sources, but direct observation and common sense are also important. However direct testing is not needed. 5. Usually the patient's performance over the preceding 24-48 hours is important, but occasionally longer periods will be relevant. 6. Middle categories imply that the patient supplies over 50 per cent of the effort. 7. Use of aids to be independent is allowed. Checo June., Barthel, D.W. ([id number]). Functional evaluation: the Barthel Index. [facility] (14)2. BARRERA Burton, Funmi Obrien., Linden White, Dianna Lockwood, [address] ().  Measuring the change indisability after inpatient rehabilitation; comparison of the responsiveness of the Barthel Index and Functional [place] Measure. Journal of Neurology, Neurosurgery, and Psychiatry, 66(4), 034-794. FABIAN Trotter.YAHAIRA, PENNY Ivory, & Dong Rubin M.A. (2004.) Assessment of post-stroke quality of life in cost-effectiveness studies: The usefulness of the Barthel Index and the EuroQoL-5D. Quality of Life Research, 15, 196-52           Physical Therapy Evaluation Charge Determination   History Examination Presentation Decision-Making   MEDIUM  Complexity : 1-2 comorbidities / personal factors will impact the outcome/ POC  MEDIUM Complexity : 3 Standardized tests and measures addressing body structure, function, activity limitation and / or participation in recreation  LOW Complexity : Stable, uncomplicated  LOW Complexity : FOTO score of       Based on the above components, the patient evaluation is determined to be of the following complexity level: LOW     Pain Ratin/10 (nerve block has not yet worn off). Activity Tolerance:   Poor-fatigued and light headed    After treatment patient left in no apparent distress:   Supine in bed, Heels elevated for pressure relief, Call bell within reach, Caregiver / family present, Side rails x 3, and nurse notified. COMMUNICATION/EDUCATION:   The patients plan of care was discussed with: Physical therapy assistant, Registered nurse, and Case management. Fall prevention education was provided and the patient/caregiver indicated understanding., Patient/family have participated as able in goal setting and plan of care. , and Patient/family agree to work toward stated goals and plan of care.     Thank you for this referral.  Ilya Diaz   Time Calculation: 40 mins

## 2021-02-19 NOTE — PROGRESS NOTES
Foot and Ankle Surgery Progress Note    Patient: Mariela Nelson MRN: [id number]  SSN: [id number]    YOB: 1945  Age: 70 y.o. Sex: male      Admit Date: 2021    1 Day Post-Op    Procedure:  Procedure(s):  CHARCOT RECONSTRUCTION OF THE LEFT FOOT WITH MUTIPLE FUSIONS, ACHILLES TENDON LENGTHENING, APPLY EXTERNAL FIXATION LEFT FOOT  ACHILLES TENDON REPAIR    Subjective:     Patient has no new complaints. He is still numb and has no active rom   >> block still in effect     Objective:     Visit Vitals  /65 (BP 1 Location: Right upper arm, BP Patient Position: At rest)   Pulse 83   Temp 97.7 °F (36.5 °C)   Resp 14   Ht 6' 1\" (1.854 m)   Wt 77.1 kg (170 lb)   SpO2 99%   BMI 22.43 kg/m²       Temp (24hrs), Av.6 °F (36.4 °C), Min:97.4 °F (36.3 °C), Max:97.9 °F (36.6 °C)      Physical Exam:    Drain minimally productive    Toes are warm and pink  Dressing is clean anad dry      Data Review: images and reports reviewed    Lab Review: All lab results for the last 24 hours reviewed.     Assessment:     Hospital Problems  Never Reviewed          Codes Class Noted POA    Charcot ankle, left ICD-10-CM: A82.507  ICD-9-CM: 094.0, 713.5  2021 Unknown        Charcot's joint of left foot ICD-10-CM: C87.468  ICD-9-CM: 094.0, 713.5  2021 Unknown              Plan/Recommendations/Medical Decision Making:     Patient still has to work with PT    Block needs to be gone so we can monitor pain    Signed By: Yulissa Carson DPM     2021

## 2021-02-20 LAB
GLUCOSE BLD STRIP.AUTO-MCNC: 133 MG/DL (ref 65–100)
GLUCOSE BLD STRIP.AUTO-MCNC: 172 MG/DL (ref 65–100)
SERVICE CMNT-IMP: ABNORMAL
SERVICE CMNT-IMP: ABNORMAL

## 2021-02-20 PROCEDURE — 74011250637 HC RX REV CODE- 250/637: Performed by: PODIATRIST

## 2021-02-20 PROCEDURE — 97530 THERAPEUTIC ACTIVITIES: CPT

## 2021-02-20 PROCEDURE — 74011250637 HC RX REV CODE- 250/637: Performed by: STUDENT IN AN ORGANIZED HEALTH CARE EDUCATION/TRAINING PROGRAM

## 2021-02-20 PROCEDURE — 97116 GAIT TRAINING THERAPY: CPT

## 2021-02-20 PROCEDURE — 99218 HC RM OBSERVATION: CPT

## 2021-02-20 PROCEDURE — 82962 GLUCOSE BLOOD TEST: CPT

## 2021-02-20 RX ORDER — INSULIN LISPRO 100 [IU]/ML
INJECTION, SOLUTION INTRAVENOUS; SUBCUTANEOUS
Status: DISCONTINUED | OUTPATIENT
Start: 2021-02-20 | End: 2021-02-20

## 2021-02-20 RX ORDER — DEXTROSE 50 % IN WATER (D50W) INTRAVENOUS SYRINGE
25-50 AS NEEDED
Status: DISCONTINUED | OUTPATIENT
Start: 2021-02-20 | End: 2021-02-20

## 2021-02-20 RX ORDER — MAGNESIUM SULFATE 100 %
4 CRYSTALS MISCELLANEOUS AS NEEDED
Status: DISCONTINUED | OUTPATIENT
Start: 2021-02-20 | End: 2021-02-20

## 2021-02-20 RX ADMIN — CETIRIZINE HYDROCHLORIDE 10 MG: 10 TABLET, FILM COATED ORAL at 08:42

## 2021-02-20 RX ADMIN — ACETAMINOPHEN 325 MG: 325 TABLET ORAL at 23:06

## 2021-02-20 RX ADMIN — APIXABAN 5 MG: 5 TABLET, FILM COATED ORAL at 10:37

## 2021-02-20 RX ADMIN — ACETAMINOPHEN 325 MG: 325 TABLET ORAL at 18:51

## 2021-02-20 RX ADMIN — VERAPAMIL HYDROCHLORIDE 240 MG: 240 TABLET, FILM COATED, EXTENDED RELEASE ORAL at 21:56

## 2021-02-20 RX ADMIN — LEVOTHYROXINE SODIUM 88 MCG: 0.09 TABLET ORAL at 06:12

## 2021-02-20 RX ADMIN — ACETAMINOPHEN 325 MG: 325 TABLET ORAL at 10:37

## 2021-02-20 RX ADMIN — Medication 10 ML: at 15:11

## 2021-02-20 RX ADMIN — APIXABAN 5 MG: 5 TABLET, FILM COATED ORAL at 17:09

## 2021-02-20 RX ADMIN — PRAVASTATIN SODIUM 10 MG: 20 TABLET ORAL at 21:57

## 2021-02-20 RX ADMIN — ACETAMINOPHEN 325 MG: 325 TABLET ORAL at 06:42

## 2021-02-20 RX ADMIN — ACETAMINOPHEN 325 MG: 325 TABLET ORAL at 15:11

## 2021-02-20 RX ADMIN — Medication 10 ML: at 22:00

## 2021-02-20 RX ADMIN — ACETAMINOPHEN 325 MG: 325 TABLET ORAL at 00:42

## 2021-02-20 NOTE — PROGRESS NOTES
Bedside and Verbal shift change report given to Opal (oncoming nurse) by Kerin Mortimer (offgoing nurse). Report included the following information SBAR, Kardex, Procedure Summary, Intake/Output and MAR.

## 2021-02-20 NOTE — PROGRESS NOTES
Foot and Ankle Surgery Progress Note    Patient: Evelyn Santos MRN: [id number]  SSN: [id number]    YOB: 1945  Age: 70 y.o. Sex: male      Admit Date: 2021    2 Days Post-Op    Procedure:  Procedure(s):  CHARCOT RECONSTRUCTION OF THE LEFT FOOT WITH MUTIPLE FUSIONS, ACHILLES TENDON LENGTHENING, APPLY EXTERNAL FIXATION LEFT FOOT  ACHILLES TENDON REPAIR    Subjective:   Pt seen at bedside this AM.  Patient has no complaints. Objective:     Visit Vitals  BP (!) 122/56 (BP 1 Location: Left upper arm, BP Patient Position: At rest)   Pulse 79   Temp 98.8 °F (37.1 °C)   Resp 17   Ht 6' 1\" (1.854 m)   Wt 77.1 kg (170 lb)   SpO2 96%   BMI 22.43 kg/m²       Temp (24hrs), Av.7 °F (37.1 °C), Min:98.3 °F (36.8 °C), Max:99 °F (37.2 °C)      Physical Exam:    GENERAL: alert, cooperative, no distress, appears stated age  Left lower extremity with ex fix in place, dressing c/d/i  Left foot digits with brisk cap refill, aROM, light touch sensation in place. Data Review:   Output by Drain (mL) 21 0701 - 21 1900 21 190 - 21 0700 21 0701 - 21 1900 21 190 - 21 0700 21 0701 - 21 0910   Mehul Drain #1 21 Right Foot   15 0 5       Lab Review:   BMP: No results found for: NA, K, CL, CO2, AGAP, GLU, BUN, CREA, GFRAA, GFRNA  CBC: No results found for: WBC, HGB, HGBEXT, HCT, HCTEXT, PLT, PLTEXT, HGBEXT, HCTEXT, PLTEXT    Assessment:     Hospital Problems  Never Reviewed          Codes Class Noted POA    Charcot ankle, left ICD-10-CM: B96.370  ICD-9-CM: 094.0, 713.5  2021 Unknown        Charcot's joint of left foot ICD-10-CM: O51.901  ICD-9-CM: 094.0, 713.5  2021 Unknown              Plan/Recommendations/Medical Decision MakinM with hx of neuropathy and Charcot is POD2 s/p left charcot foot reconstruction with external fixator application. Pain is well controlled with po meds only.  Worked with physical therapy yesterday, will do it again today. Drain output: 20cc last 24 hrs, discontinued today.  Will start eliquis home med.     Plan: discharge today pending physical therapy session this AM.     Physical Therapy:  Recommendation for discharge: (in order for the patient to meet his/her long term goals)  Physical therapy at least 2 days/week in the home     Signed By: Angella Carter DPM     February 20, 2021

## 2021-02-20 NOTE — PROGRESS NOTES
Problem: Mobility Impaired (Adult and Pediatric)  Goal: *Acute Goals and Plan of Care (Insert Text)  Description: FUNCTIONAL STATUS PRIOR TO ADMISSION: Patient was independent and active without use of DME.    HOME SUPPORT PRIOR TO ADMISSION: The patient lived with wife but did not require assist.    Physical Therapy Goals  Initiated 2/19/2021  1. Patient will move from supine to sit and sit to supine , scoot up and down, and roll side to side in bed with modified independence within 7 day(s). 2.  Patient will transfer from bed to chair and chair to bed with modified independence using the least restrictive device within 7 day(s). 3.  Patient will perform sit to stand with modified independence within 7 day(s). 4.  Patient will ambulate with modified independence for 10 feet with the least restrictive device within 7 day(s). 5.  Patient will ascend/descend 5 stairs with crutch and one handrail(s) OR bumping up on his buttocks with minimal assistance/contact guard assist within 7 day(s). Outcome: Progressing Towards Goal   PHYSICAL THERAPY TREATMENT  Patient: [facility] (89 y.o. male)  Date: 2/20/2021  Diagnosis: Charcot's joint of left foot [M14.672]  Charcot ankle, left [M14.672] <principal problem not specified>  Procedure(s) (LRB):  CHARCOT RECONSTRUCTION OF THE LEFT FOOT WITH MUTIPLE FUSIONS, ACHILLES TENDON LENGTHENING, APPLY EXTERNAL FIXATION LEFT FOOT (Left)  ACHILLES TENDON REPAIR (Left) 2 Days Post-Op  Precautions: Fall, NWB(NWB'ing LLE)  Chart, physical therapy assessment, plan of care and goals were reviewed. ASSESSMENT  Patient continues with skilled PT services and is progressing towards goals. He continues to demonstrate CGA for bed mobility and transfers and maintains NWB status well despite weight of external fixator. This afternoon he was able to amb (\"hop\") to door w/ RW and CGA. Main complaint at this moment is UE fatigue and deconditioning.  Discussed DME for home use and both PT and pt/pt's wife agreeable to RW versus crutches, but will like to attempt transfers into bathroom w/ crutches d/t size of bathroom doorway/bathroom. (will plan for tomorrow w/ either PT/OT). Pt's wife to continue to seek out DME mentioned below. Discussed plans for stair training tomorrow AM w/ plans to prepare for d/c home. Pt and pt wife asking [facility] transport will be necessary. Discussed this as a back up plan pending progress and safety w/ stair training (pt plans to \"butt bump\" into home). Will plan for w/c transport to gym. Attempted to get crutches for pt, however E.D. unit secretary reports they are unable (\"not allowed\") to give [facility] change in vendors (??). Will speak w/ ED NSG manager (#[id number]) regarding this as well as follow up w/ ASU( and/or PACU. Pt and pt'w wife aware. Current Level of Function Impacting Discharge (mobility/balance): CGA for functional mobility and transfers    Other factors to consider for discharge: External fixator, NWB, mobility below baseline. PLAN :  Patient continues to benefit from skilled intervention to address the above impairments. Continue treatment per established plan of care. to address goals. Recommendation for discharge: (in order for the patient to meet his/her long term goals)  Physical therapy at least 2 days/week in the home     This discharge recommendation:  Has been made in collaboration with the attending provider and/or case management    IF patient discharges home will need the following DME: Gait belt provided. Pt's wife to purchase RW where available for purchase and is to also obtain BSC. She is also looking into W/C to either rent or buy. Attempted to get crutches for pt, however E.D.  reports they are unable (\"not allowed\") to give out crutches for pts admitted d/t change in vendors (??).  Will speak w/ ED NSG manager regarding this as well as follow up w/ ASU and/or PACU.       SUBJECTIVE:   Patient stated Does anyone know how much this thing weighs?     OBJECTIVE DATA SUMMARY:   Critical Behavior:  Neurologic State: Alert, Appropriate for age  Orientation Level: Appropriate for age, Oriented to person, Oriented to place, Oriented to situation, Oriented to time, Oriented X4  Cognition: Appropriate decision making, Appropriate for age attention/concentration, Appropriate safety awareness, Follows commands     Functional Mobility Training:  Bed Mobility:     Supine to Sit: Contact guard assistance;Assist x1(LLE)  Sit to Supine: Contact guard assistance;Assist x1           Transfers:  Sit to Stand: Contact guard assistance  Stand to Sit: Contact guard assistance                             Balance:  Sitting: Intact  Standing: Intact; With support  Standing - Static: Constant support;Good  Standing - Dynamic : Constant support;Good  Ambulation/Gait Training:     Assistive Device: Gait belt;Walker, rolling  Ambulation - Level of Assistance: Contact guard assistance;Assist x1        Gait Abnormalities: Antalgic;Decreased step clearance; Other(\"hopping\")  Right Side Weight Bearing: Full  Left Side Weight Bearing: Non-weight bearing(external fixator)  Base of Support: Center of gravity altered;Narrowed; Shift to right     Speed/Dayna: Pace decreased (<100 feet/min)  Step Length: Left shortened;Right shortened        Interventions: Safety awareness training(of LLE)      Therapeutic Exercises:     Pain Rating:  Pt had no c/o pain. Reports if he were to bump it against an object then he would feel pain/discomfort. Activity Tolerance:   Good    After treatment patient left in no apparent distress:   Supine in bed, Call bell within reach, Caregiver / family present, Side rails x 3, and LLE elevated on pillows    COMMUNICATION/COLLABORATION:   The patients plan of care was discussed with: Registered nurse.      Trini Espitia,PTA   Time Calculation: 39 mins

## 2021-02-20 NOTE — PROGRESS NOTES
Bedside and Verbal shift change report given to [address] (oncoming nurse) by Ayesha Sidhu RN (offgoing nurse). Report included the following information SBAR, Kardex, MAR and Recent Results.

## 2021-02-20 NOTE — CONSULTS
[facility] [facility] Adult  Hospitalist Group    Hospitalist Consult  Primary Care Provider: Avis Byrnes MD  Consult requested by: Ade Garcia DPM    Subjective: Kamlesh Villa is a 70 y.o. male who presents with a PMH T1DM-insulin pump, Neuropathy, Charot's Joint of Left Foot, A-Flutter, PVC's, CKD and HTN who is s/p left charcot foot reconstruction with external fixator application on 3/57. We are asked to see the  patient in consult to assist in managing his T1DM. Review of patient's chart did not reveal any intraoperative or postoperative complications. At this time the patient has no complaints. He states he manages his dT1DM at home with diet control, insulin pump and he checks his ketones throughout the day, and his blood sugars since arrival to the floor have been ranging from 110's to 320's. He denies any headaches or dizziness. Denies any cough, chest pain, shortness of breath. Denies fever or chills. Denies nausea, vomiting, diarrhea, constipation. Review of Systems:    A comprehensive review of systems was negative except for that written in the History of Present Illness.      Past Medical History:   Diagnosis Date    Arthritis     Atrial flutter ([address]     CARDIOVERSION 2014,  ABLATION    Borderline hypothyroidism     Chronic kidney disease     stage 3a per nephrology notes    Diabetes type 1, controlled ([address]     Patient has insulin pump AND CGM MONITOR    History of cardioversion October 2014    Hypertension     Hyponatremia     per nephrology notes    PVC (premature ventricular contraction)     S/P ablation of atrial flutter March 2016    Secondary hyperparathyroidism of renal origin [facility])     per nephrology notes      Past Surgical History:   Procedure Laterality Date    COLONOSCOPY N/A 6/21/2016    COLONOSCOPY performed by Elisa Davis MD at [address] N/A 10/23/2019    COLONOSCOPY performed by Peace Esposito MD at [address] [facility] REMOVAL Bilateral     HX COLONOSCOPY  2011    HX HEENT      LASER SURGERY AND VITRECTOMY OU    HX OTHER SURGICAL  March 2016    A-flutter ablation    HX OTHER SURGICAL      Penile implant    HX TONSILLECTOMY      ID CARDIAC SURG PROCEDURE UNLIST      CARDIAC ABLATION X 2 2018, 2020, Deadra Puff     Prior to Admission medications    Medication Sig Start Date End Date Taking? Authorizing Provider   HYDROmorphone (DILAUDID) 4 mg tablet Take 1 Tab by mouth every four (4) hours as needed (For pain rated greater than 7 out 10) for up to 3 days. Max Daily Amount: 24 mg. 2/19/21 2/22/21 Yes Rick Cuba DPM   promethazine (PHENERGAN) 12.5 mg tablet Take 2 Tabs by mouth every six (6) hours as needed for Nausea. 2/19/21  Yes Rick Cuba DPM   apixaban (Eliquis) 5 mg tablet Take 5 mg by mouth two (2) times a day. Yes Provider, Historical   verapamil ER (VERELAN) 240 mg ER capsule Take 240 mg by mouth nightly. Yes Provider, Historical   aspirin delayed-release 81 mg tablet Take 81 mg by mouth daily. Yes Provider, Historical   pravastatin (PRAVACHOL) 10 mg tablet Take 10 mg by mouth nightly. Yes Provider, Historical   CALCIUM PO Take 600 mg by mouth daily. Yes Provider, Historical   cholecalciferol, vitamin D3, (VITAMIN D3 PO) Take 2,000 Units by mouth daily. Yes Provider, Historical   levothyroxine (SYNTHROID) 88 mcg tablet Take 88 mcg by mouth Daily (before breakfast). Yes Provider, Historical    insulin pump (PATIENT SUPPLIED) misc by SubCUTAneous route as needed. BASAL RATE:    12MN - 6AM  0.55 UNITS  6AM-6PM  0.7 UNITS  6PM -12 MN  0.65 UNITS    BOLUS:  1 UNIT PER 10 GRAMS OF CARBS THROUGHOUT THE DAY. PT DECREASING BASAL RATE TO 60%   Yes Provider, Historical   insulin aspart (NOVOLOG) 100 unit/mL injection by SubCUTAneous route. Yes Provider, Historical   fluticasone (FLONASE) 50 mcg/actuation nasal spray 2 Sprays by Both Nostrils route daily.    Yes Provider, Historical   cetirizine (ZYRTEC) 10 mg tablet Take 10 mg by mouth Every morning. Yes Provider, Historical     Allergies   Allergen Reactions    Hydrocodone Other (comments)     Upset stomach    Peanut Sneezing    Sulfa (Sulfonamide Antibiotics) Runny Nose and Sneezing      Family History   Problem Relation Age of Onset    Lung Disease Mother         COPD    Cancer Father     Diabetes Sister     Lung Disease Sister         CHRONIC BRONCHITIS    Kidney Disease Brother         NEPHRITIS    Cancer Sister         CANCER    Anesth Problems Neg Hx         SOCIAL HISTORY:  Patient resides at Home. Patient ambulates with independent. Smoking history: never  Alcohol history: denies. Objective:       Physical Exam:   Visit Vitals  BP (!) 122/56 (BP 1 Location: Left upper arm, BP Patient Position: At rest)   Pulse 79   Temp 98.8 °F (37.1 °C)   Resp 17   Ht 6' 1\" (1.854 m)   Wt 77.1 kg (170 lb)   SpO2 96%   BMI 22.43 kg/m²       Constitutional:  No acute distress, cooperative, pleasant    ENT:  Oral mucosa moist.    Resp:  CTA bilaterally. No wheezing/rhonchi/rales. No accessory muscle use. CV:  Regular rhythm, normal rate, S1,S2.    GI/:  Soft, non distended, non tender, no guarding, BS present. Voids Freely. Musculoskeletal:  No edema, warm. Neurologic:  Moves all extremities, LLE external fixator. AAOx3, CN II-XII reviewed. Skin:  Good turgor, no rashes or ulcers  Psych:  Good insight, Not anxious nor agitated. Data Review: All diagnostic labs and studies have been reviewed. Assessment:   Ascencion Mendez is a 70 y.o. male who presents with Charot's Joint of Left Foot. We are asked to see the patient in consult to assist in managing T1DM. Active Problems:    Charcot's joint of left foot (2/18/2021)      Charcot ankle, left (2/19/2021)        Plan:   T1DM: discussed using hospital medication and monitoring per protocol to manage BGL, patient and wife feel comfortable with his current treatment plan.  He measures his urine ketones numerous times during the day, states they have been dieting low carb x8 yrs. He declined starting basal insulin d/t concern for hypoglycemia. No clear set dose established, he titrates based on if ur ketones present, what he is going to eat and what his BGL level is.   -jennifer monitor ac&hs- he is agreeable for better record keeping, record kept at bedside difficult to make out  -ISS (hold and document if patient administers his own medication via insulin pump)  -Hga1c  -TSH  -Lipid panel, CBC, CMP  -DM educator  -Patient is using home insulin pump and has signed hospital insulin contract. A-Flutter, PVC's: continue home Eliquis. CKD 3: monitor renal fx  HTN: resume home meds, monitor BP  Neuropathy, Charot's Joint of Left Foot: s/p left charcot foot reconstruction with external fixator application on 5/04.  Managed per Primary team.    DVTppx: Eliquis  Code Status: Full Code  Diet: Cardiac  Activity: OOB to chair TID and PRN  Discharge: TBD  Ambulates: indepedent          Signed By: Helena Rivera NP     Date of Service:  2/20/2021

## 2021-02-21 VITALS
SYSTOLIC BLOOD PRESSURE: 124 MMHG | TEMPERATURE: 98.7 F | BODY MASS INDEX: 22.53 KG/M2 | RESPIRATION RATE: 17 BRPM | DIASTOLIC BLOOD PRESSURE: 61 MMHG | HEART RATE: 82 BPM | HEIGHT: 73 IN | WEIGHT: 170 LBS | OXYGEN SATURATION: 95 %

## 2021-02-21 LAB
ANION GAP SERPL CALC-SCNC: 6 MMOL/L (ref 5–15)
BUN SERPL-MCNC: 19 MG/DL (ref 6–20)
BUN/CREAT SERPL: 21 (ref 12–20)
CALCIUM SERPL-MCNC: 7.7 MG/DL (ref 8.5–10.1)
CHLORIDE SERPL-SCNC: 101 MMOL/L (ref 97–108)
CHOLEST SERPL-MCNC: 90 MG/DL
CO2 SERPL-SCNC: 25 MMOL/L (ref 21–32)
CREAT SERPL-MCNC: 0.89 MG/DL (ref 0.7–1.3)
ERYTHROCYTE [DISTWIDTH] IN BLOOD BY AUTOMATED COUNT: 13 % (ref 11.5–14.5)
EST. AVERAGE GLUCOSE BLD GHB EST-MCNC: 160 MG/DL
GLUCOSE SERPL-MCNC: 181 MG/DL (ref 65–100)
HBA1C MFR BLD: 7.2 % (ref 4–5.6)
HCT VFR BLD AUTO: 22.3 % (ref 36.6–50.3)
HDLC SERPL-MCNC: 60 MG/DL
HDLC SERPL: 1.5 {RATIO} (ref 0–5)
HGB BLD-MCNC: 7.6 G/DL (ref 12.1–17)
LDLC SERPL CALC-MCNC: 18.8 MG/DL (ref 0–100)
LIPID PROFILE,FLP: NORMAL
MCH RBC QN AUTO: 30.8 PG (ref 26–34)
MCHC RBC AUTO-ENTMCNC: 34.1 G/DL (ref 30–36.5)
MCV RBC AUTO: 90.3 FL (ref 80–99)
NRBC # BLD: 0 K/UL (ref 0–0.01)
NRBC BLD-RTO: 0 PER 100 WBC
PLATELET # BLD AUTO: 116 K/UL (ref 150–400)
PMV BLD AUTO: 10.1 FL (ref 8.9–12.9)
POTASSIUM SERPL-SCNC: 4 MMOL/L (ref 3.5–5.1)
RBC # BLD AUTO: 2.47 M/UL (ref 4.1–5.7)
SODIUM SERPL-SCNC: 132 MMOL/L (ref 136–145)
TRIGL SERPL-MCNC: 56 MG/DL (ref ?–150)
TSH SERPL DL<=0.05 MIU/L-ACNC: 0.83 UIU/ML (ref 0.36–3.74)
VLDLC SERPL CALC-MCNC: 11.2 MG/DL
WBC # BLD AUTO: 7 K/UL (ref 4.1–11.1)

## 2021-02-21 PROCEDURE — 83036 HEMOGLOBIN GLYCOSYLATED A1C: CPT

## 2021-02-21 PROCEDURE — 84443 ASSAY THYROID STIM HORMONE: CPT

## 2021-02-21 PROCEDURE — 80048 BASIC METABOLIC PNL TOTAL CA: CPT

## 2021-02-21 PROCEDURE — 85027 COMPLETE CBC AUTOMATED: CPT

## 2021-02-21 PROCEDURE — 80061 LIPID PANEL: CPT

## 2021-02-21 PROCEDURE — 97530 THERAPEUTIC ACTIVITIES: CPT

## 2021-02-21 PROCEDURE — 97116 GAIT TRAINING THERAPY: CPT

## 2021-02-21 PROCEDURE — 36415 COLL VENOUS BLD VENIPUNCTURE: CPT

## 2021-02-21 PROCEDURE — 99218 HC RM OBSERVATION: CPT

## 2021-02-21 PROCEDURE — 74011250637 HC RX REV CODE- 250/637: Performed by: STUDENT IN AN ORGANIZED HEALTH CARE EDUCATION/TRAINING PROGRAM

## 2021-02-21 PROCEDURE — 74011250637 HC RX REV CODE- 250/637: Performed by: PODIATRIST

## 2021-02-21 RX ORDER — TRAMADOL HYDROCHLORIDE 50 MG/1
50 TABLET ORAL
Qty: 12 TAB | Refills: 0 | Status: SHIPPED | OUTPATIENT
Start: 2021-02-21 | End: 2021-02-24

## 2021-02-21 RX ADMIN — ACETAMINOPHEN 325 MG: 325 TABLET ORAL at 15:40

## 2021-02-21 RX ADMIN — CETIRIZINE HYDROCHLORIDE 10 MG: 10 TABLET, FILM COATED ORAL at 07:14

## 2021-02-21 RX ADMIN — Medication 10 ML: at 06:00

## 2021-02-21 RX ADMIN — ACETAMINOPHEN 325 MG: 325 TABLET ORAL at 11:40

## 2021-02-21 RX ADMIN — ACETAMINOPHEN 325 MG: 325 TABLET ORAL at 07:14

## 2021-02-21 RX ADMIN — LEVOTHYROXINE SODIUM 88 MCG: 0.09 TABLET ORAL at 07:15

## 2021-02-21 RX ADMIN — ACETAMINOPHEN 325 MG: 325 TABLET ORAL at 03:14

## 2021-02-21 RX ADMIN — APIXABAN 5 MG: 5 TABLET, FILM COATED ORAL at 08:57

## 2021-02-21 NOTE — PROGRESS NOTES
Bedside and Verbal shift change report given to Opal (oncoming nurse) by Georgie Palacios (offgoing nurse). Report included the following information SBAR, Kardex, Procedure Summary, Intake/Output and MAR.

## 2021-02-21 NOTE — PROGRESS NOTES
Problem: Mobility Impaired (Adult and Pediatric)  Goal: *Acute Goals and Plan of Care (Insert Text)  Description: FUNCTIONAL STATUS PRIOR TO ADMISSION: Patient was independent and active without use of DME.    HOME SUPPORT PRIOR TO ADMISSION: The patient lived with wife but did not require assist.    Physical Therapy Goals  Initiated 2/19/2021  1. Patient will move from supine to sit and sit to supine , scoot up and down, and roll side to side in bed with modified independence within 7 day(s). 2.  Patient will transfer from bed to chair and chair to bed with modified independence using the least restrictive device within 7 day(s). 3.  Patient will perform sit to stand with modified independence within 7 day(s). 4.  Patient will ambulate with modified independence for 10 feet with the least restrictive device within 7 day(s). 5.  Patient will ascend/descend 5 stairs with crutch and one handrail(s) OR bumping up on his buttocks with minimal assistance/contact guard assist within 7 day(s). Outcome: Progressing Towards Goal   PHYSICAL THERAPY TREATMENT  Patient: Asia Huynh (82 y.o. male)  Date: 2/21/2021  Diagnosis: Charcot's joint of left foot [M14.672]  Charcot ankle, left [M14.672] Charcot's joint of left foot  Procedure(s) (LRB):  CHARCOT RECONSTRUCTION OF THE LEFT FOOT WITH MUTIPLE FUSIONS, ACHILLES TENDON LENGTHENING, APPLY EXTERNAL FIXATION LEFT FOOT (Left)  ACHILLES TENDON REPAIR (Left) 3 Days Post-Op  Precautions: Fall, NWB(NWB'ing LLE)  Chart, physical therapy assessment, plan of care and goals were reviewed. ASSESSMENT  Patient continues with skilled PT services and is progressing towards goals. Today assessed pt's amb using crutches. He was provided with a set of crutches to keep and they were adjusted for him. He had much difficulty with sit <> stand using crutches and with amb with the crutches, difficulty with maintaining non weight bearing LLE.  However, he did really well with the walker, adhered to NWB on his LLE and was able to negotiate side hops which I recommend he use to access his bathroom at home. He practiced negotiating stairs, scooting on his bottom. Provided min assist X 2 for stand <> seated on stairs position. Both pt and his wife expressed that they felt prepared for stairs negotiation at home. I recommended that pt only use the walker initially upon discharge and that he allow HHPT to progress him on the crutches as appropriate. Based on today's session, I can clear him for discharge to home. Current Level of Function Impacting Discharge (mobility/balance): just contact guard X 1 provided when using the walker and min assist X 2 when using the crutches and deemed not safe yet for crutches. Other factors to consider for discharge: weight of external fixator LLE         PLAN :  Patient continues to benefit from skilled intervention to address the above impairments. Continue treatment per established plan of care. to address goals.     Recommendation for discharge: (in order for the patient to meet his/her long term goals)  Physical therapy at least 2 days/week in the home AND ensure assist and/or supervision for safety with mobility as needed    This discharge recommendation:  A follow-up discussion with the attending provider and/or case management is planned    IF patient discharges home will need the following DME: none from PT, crutches provided today and per pt and his wife, they are securing the rolling walker themselves as well as a wheelchair       SUBJECTIVE:   Patient stated that he thinks he might have touched his left foot down using the crutches    OBJECTIVE DATA SUMMARY:   Chart checked, pt cleared by nursing  Critical Behavior:  Neurologic State: Alert, Appropriate for age  Orientation Level: Oriented to person, Oriented to place, Oriented to situation, Oriented to time, Oriented X4  Cognition: Appropriate for age attention/concentration, Appropriate safety awareness, Follows commands, Appropriate decision making     Functional Mobility Training:  Bed Mobility:         Not assessed            Transfers:  Sit to Stand: Contact guard assistance;Assist x1  Stand to Sit: Contact guard assistance               But min assist X 2 using crutches               Balance:  Sitting: Intact; Without support  Standing: Impaired  Standing - Static: Constant support;Good  Standing - Dynamic : Constant support;Good  Ambulation/Gait Training:  Distance (ft): 50 Feet (ft)(with 3 seated rest breaks)  Assistive Device: Gait belt;Walker, rolling  Ambulation - Level of Assistance: Contact guard assistance              Left Side Weight Bearing: Non-weight bearing            But min assist X 2 using crutches for 4 feet        Interventions: Safety awareness training           Stairs:  Number of Stairs Trained: 3  Stairs - Level of Assistance: Contact guard assistance   Rail Use: (scooting on his bottom)    Therapeutic Exercises:     Pain Rating:  None rated    Activity Tolerance:   Good and requires rest breaks    After treatment patient left in no apparent distress:   Sitting in chair, Call bell within reach, and Caregiver / family present    COMMUNICATION/COLLABORATION:   The patients plan of care was discussed with: Registered nurseRina Duarte   Time Calculation: 53 mins

## 2021-02-21 NOTE — PROGRESS NOTES
Foot and Ankle Surgery Progress Note    Patient: Suly Mas MRN: [id number]  SSN: [id number]    YOB: 1945  Age: 70 y.o. Sex: male      Admit Date: 2021    3 Days Post-Op    Procedure:  Procedure(s):  CHARCOT RECONSTRUCTION OF THE LEFT FOOT WITH MUTIPLE FUSIONS, ACHILLES TENDON LENGTHENING, APPLY EXTERNAL FIXATION LEFT FOOT  ACHILLES TENDON REPAIR    Subjective:   Pt seen at bedside this AM.   Patient has no complaints. Pt relates has been working with PT and is excited to be discharged. Objective:     Visit Vitals  /61 (BP 1 Location: Left upper arm, BP Patient Position: At rest)   Pulse 82   Temp 98.7 °F (37.1 °C)   Resp 17   Ht 6' 1\" (1.854 m)   Wt 77.1 kg (170 lb)   SpO2 95%   BMI 22.43 kg/m²       Temp (24hrs), Av.3 °F (36.8 °C), Min:98 °F (36.7 °C), Max:98.7 °F (37.1 °C)      Physical Exam:    GENERAL: alert, cooperative, no distress, appears stated age  Left foot with external fixator in place, dressings c/d/i. Left foot with toes, aROM, brisk cap refill, and light touch sensation intact.     Lab Review:   BMP:   Lab Results   Component Value Date/Time     (L) 2021 03:10 AM    K 4.0 2021 03:10 AM     2021 03:10 AM    CO2 25 2021 03:10 AM    AGAP 6 2021 03:10 AM     (H) 2021 03:10 AM    BUN 19 2021 03:10 AM    CREA 0.89 2021 03:10 AM    GFRAA >60 2021 03:10 AM    GFRNA >60 2021 03:10 AM     CBC:   Lab Results   Component Value Date/Time    WBC 7.0 2021 03:10 AM    HGB 7.6 (L) 2021 03:10 AM    HCT 22.3 (L) 2021 03:10 AM     (L) 2021 03:10 AM     Lab Results   Component Value Date/Time    Glucose 181 (H) 2021 03:10 AM    Glucose (POC) 133 (H) 2021 09:03 PM       Assessment:     Hospital Problems  Never Reviewed          Codes Class Noted POA    Charcot ankle, left ICD-10-CM: P14.145  ICD-9-CM: 094.0, 713.5  2021 Unknown        * (Principal) Charcot's joint of left foot ICD-10-CM: [id number]  ICD-9-CM: 094.0, 713.5  2021 Unknown              Plan/Recommendations/Medical Decision MakinM with hx of neuropathy and Charcot is POD3 s/p left charcot foot reconstruction with external fixator application. Pain is well controlled with po meds only. Continues to work with physical therapy on a safe discharge home. Eliquis restarted yesterday. Will work with PT today, anticipate discharge today.     Plan: discharge today pending physical therapy session     Medicine Recs:  T1DM: discussed using hospital medication and monitoring per protocol to manage BGL, patient and wife feel comfortable with his current treatment plan. He measures his urine ketones numerous times during the day, states they have been dieting low carb x8 yrs. He declined starting basal insulin d/t concern for hypoglycemia. No clear set dose established, he titrates based on if ur ketones present, what he is going to eat and what his BGL level is.   -jennifer monitor ac&hs- he is agreeable for better record keeping, record kept at bedside difficult to make out  -ISS (hold and document if patient administers his own medication via insulin pump)  -Hga1c  -TSH  -Lipid panel, CBC, CMP  -DM educator  -Patient is using home insulin pump and has signed hospital insulin contract. Patient and wife agree, will contact Dr. Sofia Kenyon (endocrinologist) on 21 for an appointment.     Physical Therapy:  Recommendation for discharge: (in order for the patient to meet his/her long term goals)  Physical therapy at least 2 days/week in the home       Signed By: Genesis Dixon DPM     2021

## 2021-02-21 NOTE — PROGRESS NOTES
[facility] Adult  Hospitalist Group                                                                                          Hospitalist Progress Note  Mei Arreola NP  Answering service: [phone] from in house phone        Date of Service:  2021  NAME:  Khadijah Greenwood  :  1945  MRN:  [id number]      Admission Summary:   Khadijah Greenwood is a 70 y.o. male who presents with a PMH T1DM-insulin pump, Neuropathy, Charot's Joint of Left Foot, A-Flutter, PVC's, CKD and HTN who is s/p left charcot foot reconstruction with external fixator application on . We are asked to see the  patient in consult to assist in managing his T1DM. Review of patient's chart did not reveal any intraoperative or postoperative complications. At this time the patient has no complaints. He states he manages his dT1DM at home with diet control, insulin pump and he checks his ketones throughout the day, and his blood sugars since arrival to the floor have been ranging from 110's to 320's. He denies any headaches or dizziness. Denies any cough, chest pain, shortness of breath. Denies fever or chills. Denies nausea, vomiting, diarrhea, constipation. Interval history / Subjective: Follow-up for issues listed below. (T1DM, A-Flutter, PVC's, CKD 3, HTN)   -Patient seen and examined, no c/o's. Assessment & Plan:     T1DM: discussed using hospital medication and monitoring per protocol to manage BGL, patient and wife feel comfortable with his current treatment plan. He measures his urine ketones numerous times during the day, states they have been dieting low carb x8 yrs. He declined starting basal insulin d/t concern for hypoglycemia.  No clear set dose established, he titrates based on if ur ketones present, what he is going to eat and what his BGL level is.   -jennifer monitor ac&hs- he is agreeable for better record keeping-ISS (hold and document if patient administers his own medication via insulin pump)  -TSH: 0.83  -HgA1c: 7.2  -Lipid panel: WNL, CBC: 7.6, Plts: 116- advised f/u PCP  -DM educator  -Patient is using home insulin pump and has signed hospital insulin contract.        A-Flutter, PVC's: continue home Eliquis. CKD 3: monitor renal fx  HTN: resume home meds, monitor BP  Neuropathy, Charot's Joint of Left Foot: s/p left charcot foot reconstruction with external fixator application on 8/86. Managed per Primary team.     DVTppx: Eliquis  Code Status: Full Code  Diet: Cardiac  Activity: OOB to chair TID and PRN  Discharge: TBD  Ambulates: indepedent     Hospital Problems  Never Reviewed          Codes Class Noted POA    Charcot ankle, left ICD-10-CM: K41.943  ICD-9-CM: 094.0, 713.5  2/19/2021 Unknown        * (Principal) Charcot's joint of left foot ICD-10-CM: J83.313  ICD-9-CM: 094.0, 713.5  2/18/2021 Unknown                Review of Systems:   A comprehensive review of systems was negative except for that written in the HPI. Vital Signs:    Last 24hrs VS reviewed since prior progress note. Most recent are:  Visit Vitals  /61 (BP 1 Location: Left upper arm, BP Patient Position: At rest)   Pulse 82   Temp 98.7 °F (37.1 °C)   Resp 17   Ht 6' 1\" (1.854 m)   Wt 77.1 kg (170 lb)   SpO2 95%   BMI 22.43 kg/m²         Intake/Output Summary (Last 24 hours) at 2/21/2021 1234  Last data filed at 2/20/2021 1911  Gross per 24 hour   Intake    Output 550 ml   Net -550 ml        Physical Examination:     I had a face to face encounter with this patient and independently examined them on 2/21/2021 as outlined below:    Constitutional:  No acute distress, cooperative, pleasant    ENT:  Oral mucosa moist.    Resp:  CTA bilaterally. No wheezing/rhonchi/rales. No accessory muscle use. CV:  Regular rhythm, normal rate, S1,S2.    GI/:  Soft, non distended, non tender, no guarding, BS present. Insulin pump. Voids Freely. Musculoskeletal:  No edema, warm, 2+ pulses throughout.     Neurologic: Moves all extremities, LLE external fixator. AAOx3, CN II-XII reviewed. Skin:  Good turgor, no rashes or ulcers  Psych:  Good insight, Not anxious nor agitated. Data Review:    Review and/or order of clinical lab test      Labs:     Recent Labs     02/21/21 0310   WBC 7.0   HGB 7.6*   HCT 22.3*   *     Recent Labs     02/21/21 0310   *   K 4.0      CO2 25   BUN 19   CREA 0.89   *   CA 7.7*     No results for input(s): ALT, AP, TBIL, TBILI, TP, ALB, GLOB, GGT, AML, LPSE in the last 72 hours. No lab exists for component: SGOT, GPT, AMYP, HLPSE  No results for input(s): INR, PTP, APTT, INREXT in the last 72 hours. No results for input(s): FE, TIBC, PSAT, FERR in the last 72 hours. No results found for: FOL, RBCF   No results for input(s): PH, PCO2, PO2 in the last 72 hours. No results for input(s): CPK, CKNDX, TROIQ in the last 72 hours.     No lab exists for component: CPKMB  Lab Results   Component Value Date/Time    Cholesterol, total 90 02/21/2021 03:10 AM    HDL Cholesterol 60 02/21/2021 03:10 AM    LDL, calculated 18.8 02/21/2021 03:10 AM    Triglyceride 56 02/21/2021 03:10 AM    CHOL/HDL Ratio 1.5 02/21/2021 03:10 AM     Lab Results   Component Value Date/Time    Glucose (POC) 133 (H) 02/20/2021 09:03 PM    Glucose (POC) 172 (H) 02/20/2021 04:27 PM    Glucose (POC) 294 (H) 02/19/2021 03:36 PM    Glucose (POC) 327 (H) 02/19/2021 11:32 AM    Glucose (POC) 253 (H) 02/19/2021 06:43 AM     Lab Results   Component Value Date/Time    Color YELLOW 04/02/2009 08:24 AM    Appearance CLEAR 04/02/2009 08:24 AM    Specific gravity 1.014 04/02/2009 08:24 AM    pH (UA) 7.5 04/02/2009 08:24 AM    Protein NEGATIVE  04/02/2009 08:24 AM    Glucose NEGATIVE  04/02/2009 08:24 AM    Ketone NEGATIVE  04/02/2009 08:24 AM    Bilirubin NEGATIVE  04/02/2009 08:24 AM    Urobilinogen 0.2 04/02/2009 08:24 AM    Nitrites NEGATIVE  04/02/2009 08:24 AM    Leukocyte Esterase NEGATIVE 04/02/2009 08:24 AM    Epithelial cells 0-5 04/02/2009 08:24 AM    Bacteria NEGATIVE  04/02/2009 08:24 AM    WBC 0-4 04/02/2009 08:24 AM    RBC 0-3 04/02/2009 08:24 AM         Medications Reviewed:     Current Facility-Administered Medications   Medication Dose Route Frequency    apixaban (ELIQUIS) tablet 5 mg  5 mg Oral BID    cetirizine (ZYRTEC) tablet 10 mg  10 mg Oral QAM    fluticasone propionate (FLONASE) 50 mcg/actuation nasal spray 2 Spray  2 Spray Both Nostrils DAILY    levothyroxine (SYNTHROID) tablet 88 mcg  88 mcg Oral ACB    pravastatin (PRAVACHOL) tablet 10 mg  10 mg Oral QHS    verapamil ER (CALAN-SR) tablet 240 mg  240 mg Oral QHS    sodium chloride (NS) flush 5-40 mL  5-40 mL IntraVENous Q8H    ondansetron (ZOFRAN) injection 4 mg  4 mg IntraVENous Q4H PRN    HYDROmorphone (DILAUDID) tablet 4 mg  4 mg Oral Q4H PRN    acetaminophen (TYLENOL) tablet 325 mg  325 mg Oral Q4H    HYDROmorphone (PF) (DILAUDID) injection 0.2 mg  0.2 mg IntraVENous Q3H PRN    insulin pump (PATIENT SUPPLIED)   SubCUTAneous PRN    glucose chewable tablet 16 g  4 Tab Oral PRN    dextrose (D50W) injection syrg 12.5-25 g  12.5-25 g IntraVENous PRN    glucagon (GLUCAGEN) injection 1 mg  1 mg IntraMUSCular PRN     ______________________________________________________________________  EXPECTED LENGTH OF STAY: - - -  ACTUAL LENGTH OF STAY:          0                 Thao Cooper NP

## 2021-02-21 NOTE — PROGRESS NOTES
NP aware that patient stated he thinks his insulin pump may not be administering correct amounts of insulin (indicating maybe less than what he programs it to administer). NP also aware patient signed contract (which is on hard chart) for management of the pump.

## 2021-04-05 ENCOUNTER — HOSPITAL ENCOUNTER (OUTPATIENT)
Dept: PREADMISSION TESTING | Age: 76
Discharge: HOME | End: 2021-04-05
Payer: MEDICARE

## 2021-04-05 ENCOUNTER — TRANSCRIBE ORDER (OUTPATIENT)
Dept: REGISTRATION | Age: 76
End: 2021-04-05

## 2021-04-05 DIAGNOSIS — Z01.812 PRE-PROCEDURE LAB EXAM: Primary | ICD-10-CM

## 2021-04-05 DIAGNOSIS — Z01.812 PRE-PROCEDURE LAB EXAM: ICD-10-CM

## 2021-04-05 PROCEDURE — U0003 INFECTIOUS AGENT DETECTION BY NUCLEIC ACID (DNA OR RNA); SEVERE ACUTE RESPIRATORY SYNDROME CORONAVIRUS 2 (SARS-COV-2) (CORONAVIRUS DISEASE [COVID-19]), AMPLIFIED PROBE TECHNIQUE, MAKING USE OF HIGH THROUGHPUT TECHNOLOGIES AS DESCRIBED BY CMS-2020-01-R: HCPCS

## 2021-04-06 LAB — SARS-COV-2, COV2NT: NOT DETECTED

## 2021-04-07 ENCOUNTER — ANESTHESIA EVENT (OUTPATIENT)
Dept: SURGERY | Age: 76
End: 2021-04-07
Payer: MEDICARE

## 2021-04-07 NOTE — PERIOP NOTES
DESTINEE PARADACALL COMPLETED, PATIENT GIVEN PREOP INSTRUCTIONS VERBALLY OVER THE PHONE PER ANESTHESIA PROTOCOL. PATIENT VERBALIZED UNDERSTANDING. PATIENT USING CHG SOAP AT HOME TO CLEANSE WITH PRIOR TO SURGERY. PATIENT HAS EXTERNAL FIXATOR TO LEFT FOOT, WILL NEED W/C DOS TO Som 17. PATIENT STATES \"I HAD PREOP LABS DRAWN AT MY PCP OFFICE PER DR. BASURTO (SURGEON) AND AN EKG DONE BY DR. Dinora Chappell- CARDIO IN THE LAST 6 MONTHS, UNDER MEDIA TAB, OFFICE NOTE AND EKG FOR DR. Valentino Fraise UNDER EXTERNAL MEDICAL RECORDS TAB 2/23/21 IN MEDIA. PATIENT HAS INSULIN PUMP.

## 2021-04-08 ENCOUNTER — HOSPITAL ENCOUNTER (OUTPATIENT)
Age: 76
Setting detail: OUTPATIENT SURGERY
Discharge: HOME | End: 2021-04-08
Attending: PODIATRIST | Admitting: PODIATRIST
Payer: MEDICARE

## 2021-04-08 ENCOUNTER — ANESTHESIA (OUTPATIENT)
Dept: SURGERY | Age: 76
End: 2021-04-08
Payer: MEDICARE

## 2021-04-08 VITALS
HEIGHT: 72 IN | DIASTOLIC BLOOD PRESSURE: 70 MMHG | OXYGEN SATURATION: 97 % | HEART RATE: 72 BPM | WEIGHT: 170 LBS | BODY MASS INDEX: 23.03 KG/M2 | RESPIRATION RATE: 20 BRPM | TEMPERATURE: 98 F | SYSTOLIC BLOOD PRESSURE: 138 MMHG

## 2021-04-08 DIAGNOSIS — M14.672 CHARCOT'S JOINT OF LEFT FOOT: Primary | ICD-10-CM

## 2021-04-08 LAB
GLUCOSE BLD STRIP.AUTO-MCNC: 151 MG/DL (ref 65–100)
GLUCOSE BLD STRIP.AUTO-MCNC: 155 MG/DL (ref 65–100)
SERVICE CMNT-IMP: ABNORMAL
SERVICE CMNT-IMP: ABNORMAL

## 2021-04-08 PROCEDURE — 76060000033 HC ANESTHESIA 1 TO 1.5 HR: Performed by: PODIATRIST

## 2021-04-08 PROCEDURE — 74011000250 HC RX REV CODE- 250: Performed by: PODIATRIST

## 2021-04-08 PROCEDURE — 77030026438 HC STYL ET INTUB CARD -A: Performed by: ANESTHESIOLOGY

## 2021-04-08 PROCEDURE — 74011250636 HC RX REV CODE- 250/636: Performed by: NURSE ANESTHETIST, CERTIFIED REGISTERED

## 2021-04-08 PROCEDURE — 77030013079 HC BLNKT BAIR HGGR 3M -A: Performed by: ANESTHESIOLOGY

## 2021-04-08 PROCEDURE — 82962 GLUCOSE BLOOD TEST: CPT

## 2021-04-08 PROCEDURE — 76210000021 HC REC RM PH II 0.5 TO 1 HR: Performed by: PODIATRIST

## 2021-04-08 PROCEDURE — 74011000250 HC RX REV CODE- 250: Performed by: NURSE ANESTHETIST, CERTIFIED REGISTERED

## 2021-04-08 PROCEDURE — 74011000258 HC RX REV CODE- 258: Performed by: NURSE ANESTHETIST, CERTIFIED REGISTERED

## 2021-04-08 PROCEDURE — 76210000006 HC OR PH I REC 0.5 TO 1 HR: Performed by: PODIATRIST

## 2021-04-08 PROCEDURE — 77030031139 HC SUT VCRL2 J&J -A: Performed by: PODIATRIST

## 2021-04-08 PROCEDURE — 74011250636 HC RX REV CODE- 250/636: Performed by: ANESTHESIOLOGY

## 2021-04-08 PROCEDURE — 2709999900 HC NON-CHARGEABLE SUPPLY: Performed by: PODIATRIST

## 2021-04-08 PROCEDURE — 74011250637 HC RX REV CODE- 250/637: Performed by: ANESTHESIOLOGY

## 2021-04-08 PROCEDURE — 76010000149 HC OR TIME 1 TO 1.5 HR: Performed by: PODIATRIST

## 2021-04-08 PROCEDURE — 77030008684 HC TU ET CUF COVD -B: Performed by: ANESTHESIOLOGY

## 2021-04-08 PROCEDURE — 74011250636 HC RX REV CODE- 250/636: Performed by: PODIATRIST

## 2021-04-08 RX ORDER — MIDAZOLAM HYDROCHLORIDE 1 MG/ML
1 INJECTION, SOLUTION INTRAMUSCULAR; INTRAVENOUS AS NEEDED
Status: DISCONTINUED | OUTPATIENT
Start: 2021-04-08 | End: 2021-04-08 | Stop reason: HOSPADM

## 2021-04-08 RX ORDER — SUCCINYLCHOLINE CHLORIDE 20 MG/ML
INJECTION INTRAMUSCULAR; INTRAVENOUS AS NEEDED
Status: DISCONTINUED | OUTPATIENT
Start: 2021-04-08 | End: 2021-04-08 | Stop reason: HOSPADM

## 2021-04-08 RX ORDER — SODIUM CHLORIDE 0.9 % (FLUSH) 0.9 %
5-40 SYRINGE (ML) INJECTION EVERY 8 HOURS
Status: DISCONTINUED | OUTPATIENT
Start: 2021-04-08 | End: 2021-04-08 | Stop reason: HOSPADM

## 2021-04-08 RX ORDER — ROCURONIUM BROMIDE 10 MG/ML
INJECTION, SOLUTION INTRAVENOUS AS NEEDED
Status: DISCONTINUED | OUTPATIENT
Start: 2021-04-08 | End: 2021-04-08 | Stop reason: HOSPADM

## 2021-04-08 RX ORDER — MORPHINE SULFATE 2 MG/ML
2 INJECTION, SOLUTION INTRAMUSCULAR; INTRAVENOUS
Status: DISCONTINUED | OUTPATIENT
Start: 2021-04-08 | End: 2021-04-08 | Stop reason: HOSPADM

## 2021-04-08 RX ORDER — SODIUM CHLORIDE, SODIUM LACTATE, POTASSIUM CHLORIDE, CALCIUM CHLORIDE 600; 310; 30; 20 MG/100ML; MG/100ML; MG/100ML; MG/100ML
125 INJECTION, SOLUTION INTRAVENOUS CONTINUOUS
Status: DISCONTINUED | OUTPATIENT
Start: 2021-04-08 | End: 2021-04-08 | Stop reason: HOSPADM

## 2021-04-08 RX ORDER — LIDOCAINE HYDROCHLORIDE 20 MG/ML
INJECTION, SOLUTION EPIDURAL; INFILTRATION; INTRACAUDAL; PERINEURAL AS NEEDED
Status: DISCONTINUED | OUTPATIENT
Start: 2021-04-08 | End: 2021-04-08 | Stop reason: HOSPADM

## 2021-04-08 RX ORDER — SODIUM CHLORIDE 9 MG/ML
25 INJECTION, SOLUTION INTRAVENOUS CONTINUOUS
Status: DISCONTINUED | OUTPATIENT
Start: 2021-04-08 | End: 2021-04-08 | Stop reason: HOSPADM

## 2021-04-08 RX ORDER — PROPOFOL 10 MG/ML
INJECTION, EMULSION INTRAVENOUS AS NEEDED
Status: DISCONTINUED | OUTPATIENT
Start: 2021-04-08 | End: 2021-04-08 | Stop reason: HOSPADM

## 2021-04-08 RX ORDER — DIPHENHYDRAMINE HYDROCHLORIDE 50 MG/ML
12.5 INJECTION, SOLUTION INTRAMUSCULAR; INTRAVENOUS AS NEEDED
Status: DISCONTINUED | OUTPATIENT
Start: 2021-04-08 | End: 2021-04-08 | Stop reason: HOSPADM

## 2021-04-08 RX ORDER — HYDROMORPHONE HYDROCHLORIDE 2 MG/1
2 TABLET ORAL
Qty: 15 TAB | Refills: 0 | Status: SHIPPED | OUTPATIENT
Start: 2021-04-08 | End: 2021-04-11

## 2021-04-08 RX ORDER — SODIUM CHLORIDE, SODIUM LACTATE, POTASSIUM CHLORIDE, CALCIUM CHLORIDE 600; 310; 30; 20 MG/100ML; MG/100ML; MG/100ML; MG/100ML
INJECTION, SOLUTION INTRAVENOUS
Status: DISCONTINUED | OUTPATIENT
Start: 2021-04-08 | End: 2021-04-08 | Stop reason: HOSPADM

## 2021-04-08 RX ORDER — PHENYLEPHRINE HCL IN 0.9% NACL 0.4MG/10ML
SYRINGE (ML) INTRAVENOUS AS NEEDED
Status: DISCONTINUED | OUTPATIENT
Start: 2021-04-08 | End: 2021-04-08 | Stop reason: HOSPADM

## 2021-04-08 RX ORDER — HYDROMORPHONE HYDROCHLORIDE 1 MG/ML
0.2 INJECTION, SOLUTION INTRAMUSCULAR; INTRAVENOUS; SUBCUTANEOUS
Status: DISCONTINUED | OUTPATIENT
Start: 2021-04-08 | End: 2021-04-08 | Stop reason: HOSPADM

## 2021-04-08 RX ORDER — ACETAMINOPHEN 325 MG/1
650 TABLET ORAL ONCE
Status: COMPLETED | OUTPATIENT
Start: 2021-04-08 | End: 2021-04-08

## 2021-04-08 RX ORDER — SODIUM CHLORIDE, SODIUM LACTATE, POTASSIUM CHLORIDE, CALCIUM CHLORIDE 600; 310; 30; 20 MG/100ML; MG/100ML; MG/100ML; MG/100ML
75 INJECTION, SOLUTION INTRAVENOUS CONTINUOUS
Status: DISCONTINUED | OUTPATIENT
Start: 2021-04-08 | End: 2021-04-08 | Stop reason: HOSPADM

## 2021-04-08 RX ORDER — LIDOCAINE HYDROCHLORIDE 10 MG/ML
0.1 INJECTION, SOLUTION EPIDURAL; INFILTRATION; INTRACAUDAL; PERINEURAL AS NEEDED
Status: DISCONTINUED | OUTPATIENT
Start: 2021-04-08 | End: 2021-04-08 | Stop reason: HOSPADM

## 2021-04-08 RX ORDER — SODIUM CHLORIDE 0.9 % (FLUSH) 0.9 %
5-40 SYRINGE (ML) INJECTION AS NEEDED
Status: DISCONTINUED | OUTPATIENT
Start: 2021-04-08 | End: 2021-04-08 | Stop reason: HOSPADM

## 2021-04-08 RX ORDER — FENTANYL CITRATE 50 UG/ML
INJECTION, SOLUTION INTRAMUSCULAR; INTRAVENOUS AS NEEDED
Status: DISCONTINUED | OUTPATIENT
Start: 2021-04-08 | End: 2021-04-08 | Stop reason: HOSPADM

## 2021-04-08 RX ORDER — FENTANYL CITRATE 50 UG/ML
25 INJECTION, SOLUTION INTRAMUSCULAR; INTRAVENOUS
Status: DISCONTINUED | OUTPATIENT
Start: 2021-04-08 | End: 2021-04-08 | Stop reason: HOSPADM

## 2021-04-08 RX ORDER — ROPIVACAINE HYDROCHLORIDE 5 MG/ML
30 INJECTION, SOLUTION EPIDURAL; INFILTRATION; PERINEURAL ONCE
Status: DISCONTINUED | OUTPATIENT
Start: 2021-04-08 | End: 2021-04-08 | Stop reason: HOSPADM

## 2021-04-08 RX ORDER — MIDAZOLAM HYDROCHLORIDE 1 MG/ML
INJECTION, SOLUTION INTRAMUSCULAR; INTRAVENOUS AS NEEDED
Status: DISCONTINUED | OUTPATIENT
Start: 2021-04-08 | End: 2021-04-08 | Stop reason: HOSPADM

## 2021-04-08 RX ORDER — MIDAZOLAM HYDROCHLORIDE 1 MG/ML
0.5 INJECTION, SOLUTION INTRAMUSCULAR; INTRAVENOUS
Status: DISCONTINUED | OUTPATIENT
Start: 2021-04-08 | End: 2021-04-08 | Stop reason: HOSPADM

## 2021-04-08 RX ORDER — FENTANYL CITRATE 50 UG/ML
50 INJECTION, SOLUTION INTRAMUSCULAR; INTRAVENOUS AS NEEDED
Status: DISCONTINUED | OUTPATIENT
Start: 2021-04-08 | End: 2021-04-08 | Stop reason: HOSPADM

## 2021-04-08 RX ORDER — ONDANSETRON 2 MG/ML
INJECTION INTRAMUSCULAR; INTRAVENOUS AS NEEDED
Status: DISCONTINUED | OUTPATIENT
Start: 2021-04-08 | End: 2021-04-08 | Stop reason: HOSPADM

## 2021-04-08 RX ORDER — DEXAMETHASONE SODIUM PHOSPHATE 4 MG/ML
INJECTION, SOLUTION INTRA-ARTICULAR; INTRALESIONAL; INTRAMUSCULAR; INTRAVENOUS; SOFT TISSUE AS NEEDED
Status: DISCONTINUED | OUTPATIENT
Start: 2021-04-08 | End: 2021-04-08 | Stop reason: HOSPADM

## 2021-04-08 RX ORDER — ONDANSETRON 2 MG/ML
4 INJECTION INTRAMUSCULAR; INTRAVENOUS AS NEEDED
Status: DISCONTINUED | OUTPATIENT
Start: 2021-04-08 | End: 2021-04-08 | Stop reason: HOSPADM

## 2021-04-08 RX ADMIN — SUCCINYLCHOLINE CHLORIDE 140 MG: 20 INJECTION, SOLUTION INTRAMUSCULAR; INTRAVENOUS at 07:34

## 2021-04-08 RX ADMIN — SODIUM CHLORIDE, POTASSIUM CHLORIDE, SODIUM LACTATE AND CALCIUM CHLORIDE: 600; 310; 30; 20 INJECTION, SOLUTION INTRAVENOUS at 07:41

## 2021-04-08 RX ADMIN — SODIUM CHLORIDE, POTASSIUM CHLORIDE, SODIUM LACTATE AND CALCIUM CHLORIDE 125 ML/HR: 600; 310; 30; 20 INJECTION, SOLUTION INTRAVENOUS at 06:48

## 2021-04-08 RX ADMIN — DEXAMETHASONE SODIUM PHOSPHATE 4 MG: 4 INJECTION, SOLUTION INTRAMUSCULAR; INTRAVENOUS at 07:40

## 2021-04-08 RX ADMIN — SODIUM CHLORIDE, POTASSIUM CHLORIDE, SODIUM LACTATE AND CALCIUM CHLORIDE: 600; 310; 30; 20 INJECTION, SOLUTION INTRAVENOUS at 07:27

## 2021-04-08 RX ADMIN — Medication 120 MCG: at 07:50

## 2021-04-08 RX ADMIN — PROPOFOL 150 MG: 10 INJECTION, EMULSION INTRAVENOUS at 07:34

## 2021-04-08 RX ADMIN — ACETAMINOPHEN 650 MG: 325 TABLET ORAL at 06:31

## 2021-04-08 RX ADMIN — Medication 80 MCG: at 08:18

## 2021-04-08 RX ADMIN — Medication 120 MCG: at 07:40

## 2021-04-08 RX ADMIN — FENTANYL CITRATE 50 MCG: 50 INJECTION, SOLUTION INTRAMUSCULAR; INTRAVENOUS at 07:27

## 2021-04-08 RX ADMIN — Medication 120 MCG: at 08:07

## 2021-04-08 RX ADMIN — ROCURONIUM BROMIDE 5 MG: 10 SOLUTION INTRAVENOUS at 07:34

## 2021-04-08 RX ADMIN — MIDAZOLAM 2 MG: 1 INJECTION INTRAMUSCULAR; INTRAVENOUS at 07:27

## 2021-04-08 RX ADMIN — Medication 80 MCG: at 08:30

## 2021-04-08 RX ADMIN — LIDOCAINE HYDROCHLORIDE 60 MG: 20 INJECTION, SOLUTION EPIDURAL; INFILTRATION; INTRACAUDAL; PERINEURAL at 07:34

## 2021-04-08 RX ADMIN — PROPOFOL 50 MG: 10 INJECTION, EMULSION INTRAVENOUS at 08:10

## 2021-04-08 RX ADMIN — ONDANSETRON HYDROCHLORIDE 4 MG: 2 INJECTION, SOLUTION INTRAMUSCULAR; INTRAVENOUS at 07:40

## 2021-04-08 RX ADMIN — WATER 2 G: 1 INJECTION INTRAMUSCULAR; INTRAVENOUS; SUBCUTANEOUS at 07:37

## 2021-04-08 RX ADMIN — DEXMEDETOMIDINE HYDROCHLORIDE 8 MCG: 100 INJECTION, SOLUTION, CONCENTRATE INTRAVENOUS at 08:11

## 2021-04-08 NOTE — OP NOTES
Operative Report    Patient: Meliotn Cesar MRN: [id number]  SSN: [id number]    YOB: 1945  Age: 70 y.o. Sex: male       Date of Surgery: 4/8/2021     Preoperative Diagnosis: CHARCOT LEFT     Postoperative Diagnosis: CHARCOT LEFT     Surgeon(s) and Role:     * Otto Mccray DPM - Primary     * Nelida Alaniz DPM - Fellow    Anesthesia: MAC     Procedure: Procedure(s):  REMOVAL OF EXTERNAL FIXATOR LEFT FOOT     Procedure in Detail: Pt brought into OR in stretcher, moved to OR table, placed in supine position. A team time out was conducted with all team members present. Anesthesia sedated patient. Pt was prepped and draped in a normal sterile fashion. The external fixator was cut off from wires and wires removed. The pin sites were curetted and irrigated with copious sterile normal saline. The pin sites were dressed with betadine soaked adaptic, covered with dry sterile 4x4 gauze, kerlix. A cast placed. Pt was awakened by anesthesia, moved to stretcher, and transferred to PACU in normal and good condition. Estimated Blood Loss:  <2cc    Tourniquet Time: * No tourniquets in log *      Implants: * No implants in log *            Specimens: * No specimens in log *        Drains: None                Complications: None    Counts: Sponge and needle counts were correct times two.     Signed By:  Kelli Shetty DPM     April 8, 2021

## 2021-04-08 NOTE — ANESTHESIA PREPROCEDURE EVALUATION
Relevant Problems   ENDOCRINE   (+) Charcot's joint of left foot       Anesthetic History   No history of anesthetic complications            Review of Systems / Medical History  Patient summary reviewed, nursing notes reviewed and pertinent labs reviewed    Pulmonary  Within defined limits                 Neuro/Psych   Within defined limits           Cardiovascular  Within defined limits  Hypertension        Dysrhythmias : atrial fibrillation and atrial flutter           GI/Hepatic/Renal  Within defined limits              Endo/Other  Within defined limits  Diabetes: type 1, using insulin  Hypothyroidism  Arthritis     Other Findings              Physical Exam    Airway  Mallampati: II  TM Distance: > 6 cm  Neck ROM: normal range of motion   Mouth opening: Normal     Cardiovascular  Regular rate and rhythm,  S1 and S2 normal,  no murmur, click, rub, or gallop             Dental  No notable dental hx       Pulmonary  Breath sounds clear to auscultation               Abdominal  GI exam deferred       Other Findings            Anesthetic Plan    ASA: 3  Anesthesia type: general          Induction: Intravenous  Anesthetic plan and risks discussed with: Patient

## 2021-04-08 NOTE — DISCHARGE INSTRUCTIONS
INSTRUCTIONS FOLLOWING FOOT SURGERY    ACTIVITY:  · Elevate feet for 48 hours  · Use ice as directed by your doctor  · Use crutches / walker as directed by your doctor, and follow your doctors instructions as to your weight bearing status: NO WEIGHT TO LEFT FOOT    DIET:  · Clear liquids until no nausea or vomiting; then light diet for the first day  · An advance to regular diet on second day, unless your doctor orders otherwise. PAIN:  · Take pain medication as directed by your doctor. · Call your doctor if pain is not relieved by medication. · Do not take aspirin or blood thinners until directed by your doctor. DRESSING CARE: keep dressing clean and dry, do not remove       FOLLOW-UP PHONE CALLS:  · Calls will be made by nursing staff. · If you had any problems, call your doctor as needed. CALL YOUR DOCTOR IF:  · Excessive bleeding that does not stop after holding mild pressure over the area  · Temperature of 101° F or above  · Redness, excessive swelling or bruising, and/or green or yellow, smelly discharge from incision  · Loss of sensation -cold, white, or blue toes    AFTER ANESTHESIA :   · For the first 24 hours: do not drive, drink alcoholic beverages, or make important decisions. · Be aware of dizziness following anesthesia and while taking pain medication.       APPOINTMENT DATE/TIME: please call for an appointment    YOUR DOCTORS PHONE NUMBER: [phone]    Patients signature:  Date: 4/8/2021  Discharging Nurse:

## 2021-04-08 NOTE — BRIEF OP NOTE
Brief Postoperative Note    Patient: Mitch Ledesma  YOB: 1945  MRN: [id number]    Date of Procedure: 4/8/2021     Pre-Op Diagnosis: CHARCOT LEFT    Post-Op Diagnosis: Same as preoperative diagnosis.       Procedure(s):  REMOVAL OF EXTERNAL FIXATOR LEFT FOOT    Surgeon(s):  JOANNA Strong DPM    Surgical Assistant: None    Anesthesia: MAC     Estimated Blood Loss (mL): Minimal    Complications: None    Specimens: * No specimens in log *     Implants: * No implants in log *    Drains: * No LDAs found *    Findings: left foot with external fixator    Electronically Signed by Faith Fowler DPM on 4/8/2021 at 8:58 AM

## 2021-04-08 NOTE — ANESTHESIA POSTPROCEDURE EVALUATION
Post-Anesthesia Evaluation and Assessment    Patient: Jairo Cates MRN: [id number]  SSN: [id number]    YOB: 1945  Age: 70 y.o. Sex: male      I have evaluated the patient and they are stable and ready for discharge from the PACU. Cardiovascular Function/Vital Signs  Visit Vitals  /63   Pulse 79   Temp 36.7 °C (98 °F)   Resp 15   Ht 6' (1.829 m)   Wt 77.1 kg (170 lb)   SpO2 98%   BMI 23.06 kg/m²       Patient is status post MAC anesthesia for Procedure(s):  REMOVAL OF EXTERNAL FIXATOR LEFT FOOT. Nausea/Vomiting: None    Postoperative hydration reviewed and adequate. Pain:  Pain Scale 1: Numeric (0 - 10) (04/08/21 0849)  Pain Intensity 1: 0 (04/08/21 0849)   Managed    Neurological Status:   Neuro (WDL): Exceptions to WDL (04/08/21 0849)  Neuro  Neurologic State: Alert (04/08/21 0849)  LUE Motor Response: Purposeful (04/08/21 0849)  LLE Motor Response: Numbness; Purposeful (04/08/21 0849)  RUE Motor Response: Purposeful (04/08/21 0849)  RLE Motor Response: Numbness; Purposeful (04/08/21 0849)   At baseline    Mental Status, Level of Consciousness: Alert and  oriented to person, place, and time    Pulmonary Status:   O2 Device: Room air (04/08/21 0850)   Adequate oxygenation and airway patent    Complications related to anesthesia: None    Post-anesthesia assessment completed. No concerns    Signed By: Miguel Isidro MD     April 8, 2021              Procedure(s):  REMOVAL OF EXTERNAL FIXATOR LEFT FOOT. general    <[email or web address]>    INITIAL Post-op Vital signs:   Vitals Value Taken Time   /66 04/08/21 0900   Temp     Pulse 76 04/08/21 0904   Resp 11 04/08/21 0904   SpO2 96 % 04/08/21 0904   Vitals shown include unvalidated device data.

## 2021-11-23 ENCOUNTER — TRANSCRIBE ORDER (OUTPATIENT)
Dept: REGISTRATION | Age: 76
End: 2021-11-23

## 2021-11-23 DIAGNOSIS — Z01.812 PRE-PROCEDURE LAB EXAM: Primary | ICD-10-CM

## 2021-11-29 ENCOUNTER — HOSPITAL ENCOUNTER (OUTPATIENT)
Dept: PREADMISSION TESTING | Age: 76
Discharge: HOME | End: 2021-11-29
Attending: PODIATRIST
Payer: MEDICARE

## 2021-11-29 DIAGNOSIS — Z01.812 PRE-PROCEDURE LAB EXAM: ICD-10-CM

## 2021-11-29 PROCEDURE — U0005 INFEC AGEN DETEC AMPLI PROBE: HCPCS

## 2021-12-01 LAB
SARS-COV-2, XPLCVT: NOT DETECTED
SOURCE, COVRS: NORMAL

## 2021-12-02 ENCOUNTER — ANESTHESIA (OUTPATIENT)
Dept: SURGERY | Age: 76
End: 2021-12-02
Payer: MEDICARE

## 2021-12-02 ENCOUNTER — ANESTHESIA EVENT (OUTPATIENT)
Dept: SURGERY | Age: 76
End: 2021-12-02
Payer: MEDICARE

## 2021-12-02 ENCOUNTER — HOSPITAL ENCOUNTER (OUTPATIENT)
Age: 76
Setting detail: OUTPATIENT SURGERY
Discharge: HOME | End: 2021-12-02
Attending: PODIATRIST | Admitting: PODIATRIST
Payer: MEDICARE

## 2021-12-02 VITALS
SYSTOLIC BLOOD PRESSURE: 109 MMHG | BODY MASS INDEX: 24.38 KG/M2 | TEMPERATURE: 97.7 F | DIASTOLIC BLOOD PRESSURE: 66 MMHG | WEIGHT: 180 LBS | OXYGEN SATURATION: 97 % | HEART RATE: 65 BPM | HEIGHT: 72 IN | RESPIRATION RATE: 12 BRPM

## 2021-12-02 LAB
GLUCOSE BLD STRIP.AUTO-MCNC: 165 MG/DL (ref 65–117)
GLUCOSE BLD STRIP.AUTO-MCNC: 170 MG/DL (ref 65–117)
SERVICE CMNT-IMP: ABNORMAL
SERVICE CMNT-IMP: ABNORMAL

## 2021-12-02 PROCEDURE — 77030031139 HC SUT VCRL2 J&J -A: Performed by: PODIATRIST

## 2021-12-02 PROCEDURE — 74011250636 HC RX REV CODE- 250/636: Performed by: NURSE ANESTHETIST, CERTIFIED REGISTERED

## 2021-12-02 PROCEDURE — 77030016652 HC BUR OVL3 STRY -B: Performed by: PODIATRIST

## 2021-12-02 PROCEDURE — 74011000250 HC RX REV CODE- 250: Performed by: NURSE ANESTHETIST, CERTIFIED REGISTERED

## 2021-12-02 PROCEDURE — 74011250637 HC RX REV CODE- 250/637: Performed by: ANESTHESIOLOGY

## 2021-12-02 PROCEDURE — 77030020754 HC CUF TRNQT 2BLA STRY -B: Performed by: PODIATRIST

## 2021-12-02 PROCEDURE — 76060000036 HC ANESTHESIA 2.5 TO 3 HR: Performed by: PODIATRIST

## 2021-12-02 PROCEDURE — C1713 ANCHOR/SCREW BN/BN,TIS/BN: HCPCS | Performed by: PODIATRIST

## 2021-12-02 PROCEDURE — 76210000020 HC REC RM PH II FIRST 0.5 HR: Performed by: PODIATRIST

## 2021-12-02 PROCEDURE — 77030020268 HC MISC GENERAL SUPPLY: Performed by: PODIATRIST

## 2021-12-02 PROCEDURE — 77030021305 HC BLD TEAR RASP BRSM -B: Performed by: PODIATRIST

## 2021-12-02 PROCEDURE — 77030002916 HC SUT ETHLN J&J -A: Performed by: PODIATRIST

## 2021-12-02 PROCEDURE — 82962 GLUCOSE BLOOD TEST: CPT

## 2021-12-02 PROCEDURE — 76210000017 HC OR PH I REC 1.5 TO 2 HR: Performed by: PODIATRIST

## 2021-12-02 PROCEDURE — 77030036687 HC SHOE PSTOP S2SG -A

## 2021-12-02 PROCEDURE — 74011000250 HC RX REV CODE- 250: Performed by: PODIATRIST

## 2021-12-02 PROCEDURE — 77030022324 HC BUR OVAL AGRRES BRSM -B: Performed by: PODIATRIST

## 2021-12-02 PROCEDURE — 2709999900 HC NON-CHARGEABLE SUPPLY: Performed by: PODIATRIST

## 2021-12-02 PROCEDURE — 74011250636 HC RX REV CODE- 250/636: Performed by: ANESTHESIOLOGY

## 2021-12-02 PROCEDURE — 76010000132 HC OR TIME 2.5 TO 3 HR: Performed by: PODIATRIST

## 2021-12-02 RX ORDER — MIDAZOLAM HYDROCHLORIDE 1 MG/ML
INJECTION, SOLUTION INTRAMUSCULAR; INTRAVENOUS AS NEEDED
Status: DISCONTINUED | OUTPATIENT
Start: 2021-12-02 | End: 2021-12-02 | Stop reason: HOSPADM

## 2021-12-02 RX ORDER — MIDAZOLAM HYDROCHLORIDE 1 MG/ML
0.5 INJECTION, SOLUTION INTRAMUSCULAR; INTRAVENOUS
Status: DISCONTINUED | OUTPATIENT
Start: 2021-12-02 | End: 2021-12-02 | Stop reason: HOSPADM

## 2021-12-02 RX ORDER — FUROSEMIDE 20 MG/1
20 TABLET ORAL DAILY
COMMUNITY

## 2021-12-02 RX ORDER — MORPHINE SULFATE 4 MG/ML
INJECTION INTRAVENOUS AS NEEDED
Status: DISCONTINUED | OUTPATIENT
Start: 2021-12-02 | End: 2021-12-02 | Stop reason: HOSPADM

## 2021-12-02 RX ORDER — FENTANYL CITRATE 50 UG/ML
50 INJECTION, SOLUTION INTRAMUSCULAR; INTRAVENOUS AS NEEDED
Status: DISCONTINUED | OUTPATIENT
Start: 2021-12-02 | End: 2021-12-02 | Stop reason: HOSPADM

## 2021-12-02 RX ORDER — FENTANYL CITRATE 50 UG/ML
INJECTION, SOLUTION INTRAMUSCULAR; INTRAVENOUS AS NEEDED
Status: DISCONTINUED | OUTPATIENT
Start: 2021-12-02 | End: 2021-12-02 | Stop reason: HOSPADM

## 2021-12-02 RX ORDER — SODIUM CHLORIDE 9 MG/ML
50 INJECTION, SOLUTION INTRAVENOUS CONTINUOUS
Status: DISCONTINUED | OUTPATIENT
Start: 2021-12-02 | End: 2021-12-02 | Stop reason: HOSPADM

## 2021-12-02 RX ORDER — PROPOFOL 10 MG/ML
INJECTION, EMULSION INTRAVENOUS
Status: DISCONTINUED | OUTPATIENT
Start: 2021-12-02 | End: 2021-12-02 | Stop reason: HOSPADM

## 2021-12-02 RX ORDER — BUPIVACAINE HYDROCHLORIDE 5 MG/ML
INJECTION, SOLUTION EPIDURAL; INTRACAUDAL AS NEEDED
Status: DISCONTINUED | OUTPATIENT
Start: 2021-12-02 | End: 2021-12-02 | Stop reason: HOSPADM

## 2021-12-02 RX ORDER — MIDAZOLAM HYDROCHLORIDE 1 MG/ML
1 INJECTION, SOLUTION INTRAMUSCULAR; INTRAVENOUS AS NEEDED
Status: DISCONTINUED | OUTPATIENT
Start: 2021-12-02 | End: 2021-12-02 | Stop reason: HOSPADM

## 2021-12-02 RX ORDER — FENTANYL CITRATE 50 UG/ML
25 INJECTION, SOLUTION INTRAMUSCULAR; INTRAVENOUS
Status: DISCONTINUED | OUTPATIENT
Start: 2021-12-02 | End: 2021-12-02 | Stop reason: HOSPADM

## 2021-12-02 RX ORDER — DEXMEDETOMIDINE HYDROCHLORIDE 100 UG/ML
INJECTION, SOLUTION INTRAVENOUS AS NEEDED
Status: DISCONTINUED | OUTPATIENT
Start: 2021-12-02 | End: 2021-12-02 | Stop reason: HOSPADM

## 2021-12-02 RX ORDER — EPHEDRINE SULFATE/0.9% NACL/PF 50 MG/5 ML
5 SYRINGE (ML) INTRAVENOUS AS NEEDED
Status: DISCONTINUED | OUTPATIENT
Start: 2021-12-02 | End: 2021-12-02 | Stop reason: HOSPADM

## 2021-12-02 RX ORDER — BUPIVACAINE HYDROCHLORIDE AND EPINEPHRINE 2.5; 5 MG/ML; UG/ML
INJECTION, SOLUTION EPIDURAL; INFILTRATION; INTRACAUDAL; PERINEURAL AS NEEDED
Status: DISCONTINUED | OUTPATIENT
Start: 2021-12-02 | End: 2021-12-02 | Stop reason: HOSPADM

## 2021-12-02 RX ORDER — SODIUM CHLORIDE 0.9 % (FLUSH) 0.9 %
5-40 SYRINGE (ML) INJECTION EVERY 8 HOURS
Status: DISCONTINUED | OUTPATIENT
Start: 2021-12-02 | End: 2021-12-02 | Stop reason: HOSPADM

## 2021-12-02 RX ORDER — CEFAZOLIN SODIUM 1 G/3ML
INJECTION, POWDER, FOR SOLUTION INTRAMUSCULAR; INTRAVENOUS AS NEEDED
Status: DISCONTINUED | OUTPATIENT
Start: 2021-12-02 | End: 2021-12-02 | Stop reason: HOSPADM

## 2021-12-02 RX ORDER — SODIUM CHLORIDE, SODIUM LACTATE, POTASSIUM CHLORIDE, CALCIUM CHLORIDE 600; 310; 30; 20 MG/100ML; MG/100ML; MG/100ML; MG/100ML
125 INJECTION, SOLUTION INTRAVENOUS CONTINUOUS
Status: DISCONTINUED | OUTPATIENT
Start: 2021-12-02 | End: 2021-12-02 | Stop reason: HOSPADM

## 2021-12-02 RX ORDER — MORPHINE SULFATE 2 MG/ML
2 INJECTION, SOLUTION INTRAMUSCULAR; INTRAVENOUS
Status: DISCONTINUED | OUTPATIENT
Start: 2021-12-02 | End: 2021-12-02 | Stop reason: HOSPADM

## 2021-12-02 RX ORDER — HYDROMORPHONE HYDROCHLORIDE 1 MG/ML
0.2 INJECTION, SOLUTION INTRAMUSCULAR; INTRAVENOUS; SUBCUTANEOUS
Status: DISCONTINUED | OUTPATIENT
Start: 2021-12-02 | End: 2021-12-02 | Stop reason: HOSPADM

## 2021-12-02 RX ORDER — LIDOCAINE HYDROCHLORIDE 10 MG/ML
0.1 INJECTION, SOLUTION EPIDURAL; INFILTRATION; INTRACAUDAL; PERINEURAL AS NEEDED
Status: DISCONTINUED | OUTPATIENT
Start: 2021-12-02 | End: 2021-12-02 | Stop reason: HOSPADM

## 2021-12-02 RX ORDER — DIPHENHYDRAMINE HYDROCHLORIDE 50 MG/ML
12.5 INJECTION, SOLUTION INTRAMUSCULAR; INTRAVENOUS AS NEEDED
Status: DISCONTINUED | OUTPATIENT
Start: 2021-12-02 | End: 2021-12-02 | Stop reason: HOSPADM

## 2021-12-02 RX ORDER — SODIUM CHLORIDE 9 MG/ML
1000 INJECTION, SOLUTION INTRAVENOUS CONTINUOUS
Status: DISCONTINUED | OUTPATIENT
Start: 2021-12-02 | End: 2021-12-02 | Stop reason: HOSPADM

## 2021-12-02 RX ORDER — ONDANSETRON 2 MG/ML
4 INJECTION INTRAMUSCULAR; INTRAVENOUS AS NEEDED
Status: DISCONTINUED | OUTPATIENT
Start: 2021-12-02 | End: 2021-12-02 | Stop reason: HOSPADM

## 2021-12-02 RX ORDER — OXYCODONE AND ACETAMINOPHEN 5; 325 MG/1; MG/1
1 TABLET ORAL AS NEEDED
Status: DISCONTINUED | OUTPATIENT
Start: 2021-12-02 | End: 2021-12-02 | Stop reason: HOSPADM

## 2021-12-02 RX ORDER — ACETAMINOPHEN 325 MG/1
650 TABLET ORAL ONCE
Status: COMPLETED | OUTPATIENT
Start: 2021-12-02 | End: 2021-12-02

## 2021-12-02 RX ORDER — PROPOFOL 10 MG/ML
INJECTION, EMULSION INTRAVENOUS AS NEEDED
Status: DISCONTINUED | OUTPATIENT
Start: 2021-12-02 | End: 2021-12-02 | Stop reason: HOSPADM

## 2021-12-02 RX ORDER — SODIUM CHLORIDE 0.9 % (FLUSH) 0.9 %
5-40 SYRINGE (ML) INJECTION AS NEEDED
Status: DISCONTINUED | OUTPATIENT
Start: 2021-12-02 | End: 2021-12-02 | Stop reason: HOSPADM

## 2021-12-02 RX ORDER — ONDANSETRON 2 MG/ML
INJECTION INTRAMUSCULAR; INTRAVENOUS AS NEEDED
Status: DISCONTINUED | OUTPATIENT
Start: 2021-12-02 | End: 2021-12-02 | Stop reason: HOSPADM

## 2021-12-02 RX ADMIN — ACETAMINOPHEN 650 MG: 325 TABLET ORAL at 09:33

## 2021-12-02 RX ADMIN — FENTANYL CITRATE 50 MCG: 50 INJECTION, SOLUTION INTRAMUSCULAR; INTRAVENOUS at 10:35

## 2021-12-02 RX ADMIN — PROPOFOL 30 MG: 10 INJECTION, EMULSION INTRAVENOUS at 09:44

## 2021-12-02 RX ADMIN — MIDAZOLAM 2 MG: 1 INJECTION INTRAMUSCULAR; INTRAVENOUS at 09:36

## 2021-12-02 RX ADMIN — PROPOFOL 30 MG: 10 INJECTION, EMULSION INTRAVENOUS at 11:42

## 2021-12-02 RX ADMIN — DEXMEDETOMIDINE HYDROCHLORIDE 40 MCG: 100 INJECTION, SOLUTION, CONCENTRATE INTRAVENOUS at 10:35

## 2021-12-02 RX ADMIN — SODIUM CHLORIDE, POTASSIUM CHLORIDE, SODIUM LACTATE AND CALCIUM CHLORIDE 125 ML/HR: 600; 310; 30; 20 INJECTION, SOLUTION INTRAVENOUS at 09:37

## 2021-12-02 RX ADMIN — PROPOFOL 20 MG: 10 INJECTION, EMULSION INTRAVENOUS at 09:53

## 2021-12-02 RX ADMIN — ONDANSETRON HYDROCHLORIDE 4 MG: 2 INJECTION, SOLUTION INTRAMUSCULAR; INTRAVENOUS at 11:49

## 2021-12-02 RX ADMIN — PROPOFOL 30 MG: 10 INJECTION, EMULSION INTRAVENOUS at 10:36

## 2021-12-02 RX ADMIN — MORPHINE SULFATE 4 MG: 4 INJECTION INTRAVENOUS at 12:07

## 2021-12-02 RX ADMIN — FENTANYL CITRATE 50 MCG: 50 INJECTION, SOLUTION INTRAMUSCULAR; INTRAVENOUS at 09:44

## 2021-12-02 RX ADMIN — CEFAZOLIN 2 G: 330 INJECTION, POWDER, FOR SOLUTION INTRAMUSCULAR; INTRAVENOUS at 09:47

## 2021-12-02 RX ADMIN — DEXMEDETOMIDINE HYDROCHLORIDE 10 MCG: 100 INJECTION, SOLUTION, CONCENTRATE INTRAVENOUS at 11:42

## 2021-12-02 RX ADMIN — PROPOFOL 75 MCG/KG/MIN: 10 INJECTION, EMULSION INTRAVENOUS at 09:45

## 2021-12-02 NOTE — ANESTHESIA PREPROCEDURE EVALUATION
Relevant Problems   ENDOCRINE   (+) Charcot's joint of left foot       Anesthetic History   No history of anesthetic complications            Review of Systems / Medical History  Patient summary reviewed, nursing notes reviewed and pertinent labs reviewed    Pulmonary  Within defined limits                 Neuro/Psych   Within defined limits           Cardiovascular  Within defined limits  Hypertension        Dysrhythmias            GI/Hepatic/Renal  Within defined limits       Renal disease: CRI       Endo/Other  Within defined limits  Diabetes: using insulin  Hypothyroidism  Arthritis     Other Findings              Physical Exam    Airway  Mallampati: II  TM Distance: > 6 cm  Neck ROM: normal range of motion   Mouth opening: Normal     Cardiovascular  Regular rate and rhythm,  S1 and S2 normal,  no murmur, click, rub, or gallop             Dental  No notable dental hx       Pulmonary  Breath sounds clear to auscultation               Abdominal  GI exam deferred       Other Findings            Anesthetic Plan    ASA: 3  Anesthesia type: MAC            Anesthetic plan and risks discussed with: Patient

## 2021-12-02 NOTE — ANESTHESIA POSTPROCEDURE EVALUATION
Procedure(s):  LEFT FOOT DELAYED PRIMARY CLOSURE/ OSTECTOMY CUBOID. MAC    Anesthesia Post Evaluation      Multimodal analgesia: multimodal analgesia used between 6 hours prior to anesthesia start to PACU discharge  Patient location during evaluation: PACU  Patient participation: complete - patient participated  Level of consciousness: awake  Pain score: 2  Pain management: adequate  Airway patency: patent  Anesthetic complications: no  Cardiovascular status: acceptable  Respiratory status: acceptable  Hydration status: acceptable  Comments: I have evaluated the patient and meets criteria for discharge from PACU. Yareli Munoz MD  Post anesthesia nausea and vomiting:  controlled  Final Post Anesthesia Temperature Assessment:  Normothermia (36.0-37.5 degrees C)      INITIAL Post-op Vital signs:   Vitals Value Taken Time   BP 97/55 12/02/21 1250   Temp 36.7 °C (98 °F) 12/02/21 1214   Pulse 73 12/02/21 1254   Resp 21 12/02/21 1254   SpO2 96 % 12/02/21 1254   Vitals shown include unvalidated device data.

## 2021-12-02 NOTE — BRIEF OP NOTE
Brief Postoperative Note    Patient: Gregory Grijalva  YOB: 1945  MRN: [id number]    Date of Procedure: 12/2/2021     Pre-Op Diagnosis:      1. Neuropathic ulcer / non-healing wound, left foot     2. Exostosis, cuboid, left foot     3. Charcot arthropathy, left foot     4. Non-union s/p midfoot arthrodesis, left foot    Post-Op Diagnosis: same    Procedure(s):  1. LEFT FOOT DELAYED PRIMARY CLOSURE  2. OSTECTOMY CUBOID  3.  [place] of BMA from left distal tibia and delivery to nonunion site, left tarsometatarsal joint 2    Surgeon(s):  JOANNA Chan MD    Surgical Assistant: None    Anesthesia: MAC with 13 cc of 0.5% marcaine with epi    Estimated Blood Loss (mL): 50    Complications: none    Specimens: none    Implants: none  Drains: none    Findings: normal healthy tissue, enlarged cuboid bone    Electronically Signed by Bonny Tavarez MD on 12/2/2021 at 12:17 PM    Dawna Werner DPM, AACFAS  Foot and Ankle Surgery Fellow  [facility] Reconstructive, Revision, and Limb Preservation Surgery of the Foot and Ankle

## 2021-12-02 NOTE — OP NOTES
Date of Procedure: 12/2/2021     Pre-Op Diagnosis:      1. Neuropathic ulcer / non-healing wound, left foot     2. Exostosis, cuboid, left foot     3. Charcot arthropathy, left foot     4. Non-union s/p midfoot arthrodesis, left foot    Post-Op Diagnosis: same    Procedure(s):  1. LEFT FOOT DELAYED PRIMARY CLOSURE  2. OSTECTOMY CUBOID  3. [place] of [facility]MA from left distal tibia and delivery to nonunion site, left tarsometatarsal joint 2    Surgeon(s):  JOANNA Coon MD    Surgical Assistant: None    Anesthesia: MAC with 13 cc of 0.5% marcaine with epi    Estimated Blood Loss (mL): 50    Complications: none    Specimens: none    Implants: none  Drains: none    Findings: normal healthy tissue, enlarged cuboid bone    Indications for surgery: Alee Perscott is a 68-year-old gentleman with a history of Charcot arthropathy of his left foot with recent history of a midfoot Charcot reconstruction with midfoot arthrodesis. He has done well overall but has had difficulty healing a small plantar neuropathic ulcer is gone to and on to a nonunion of tarsometatarsal joint 2. It was determined that he would benefit from surgical intervention. He was consented in the preoperative area. All questions were answered no guarantees were given. Patient brought into the operating room placed on the operating table in supine position. Surgical timeout ensued according to standard protocol. Local anesthesia was infiltrated with 13 cc of quarter percent Marcaine with epinephrine. The patient received appropriate antibiotic dosing prior to surgery. The patient's operative extremity was then scrubbed, prepped, and draped in the usual aseptic manner. We directed our attention to the operative foot where a small neuropathic ulcer was noted laterally. There is a palpable exostosis underneath it we ellipsed out the ulcer is approximately 5-1 margins.   We dissected through this bluntly through subcutaneous tissues. We got down to the fascial layer we used a Bovie to incise through this layer down to bone of the cuboid. We elevated all the periosteal tissues off of the cuboid. We confirmed that it was infected cuboid and was the offending exostosis under intraoperative C arm. We utilized a combination of curved osteotomes sagittal saw, oval bur, and reciprocating rasp to smooth down and remove the area that is being exostosis. We confirmed our reduction of this deformity both clinically and radiographically. We irrigated the site with a liter of saline and pulse lavage. We closed the fascial layer with 3-0 Vicryl the fat layer with 3-0 Vicryl subcutaneous layer with 4-0 Vicryl. We then through retention sutures with #2 nylon with rubber bolsters just in a closed the skin with 3-0 nylon in a mattress fashion. Then, under C-arm magnification we identified the joint line of the ipsilateral ankle and utilized. [employer] standard bone marrow aspirate harvesting system to harvest from the distal tibia. We did this under C arm to ensure that we were in the correct location and to avoid the joint. We harvested about 10 cc and then under C-arm magnification we delivered this directly to the nonunion site at tarsometatarsal joint 2. The sites were flushed and the skin was closed with 3-0 nylon. Further dressings were applied with Xeroform dry sterile dressing and a mildly compressive Ace bandage. Patient was awoken and transferred to the stretcher and safely transported to the recovery area with vital signs stable neurovascular status intact to the operative extremity. After period of postoperative monitoring, the patient will be discharged home.     Alan Bautista DPM, AACFAS  Foot and Ankle Surgery Fellow  [facility] Reconstructive, Revision, and Limb Preservation Surgery of the Foot and Ankle    THIS IS THE FOOT AND ANKLE SURGERY SERVICE

## 2021-12-02 NOTE — DISCHARGE INSTRUCTIONS
Activity: Please keep your operative foot elevated above the level of your heart as much as possible. You may apply some ice behind your knee for 10 minutes at a time. Weight bearing: Non-weight bearing to the operative extremity with a post-op shoe for protection. Follow-up: Please follow-up with Dr. Agustina Kirkpatrick and Dr. Ruma Villalobos within 1 week at [facility] at their [place] office by calling [phone]. I have also alerted the office as well. Pain protocol: Dr. Ruma Villalobos has sent appropriate pain medications to your pharmacy. Please take as directed    Dressing: please keep clean, dry, and intact until your first appointment. If it gets soiled or wet, please call the office immediately. Monitor for drainage  / pain out of proportion, redness, swelling, nausea, vomiting, fever, chills, shortness of breath    Antibiotics: No additional antibiotics necessary at this time    Severiano Arnett DPM, [facility]  Foot and Ankle Surgery Fellow  [address] [place] [address] in Reconstructive, Revision, and Limb Preservation Surgery of the Foot and Ankle    ______________________________________________________________________    Anesthesia Discharge Instructions    After general anesthesia or intervenous sedation, for 24 hours or while taking prescription Narcotics:  · Limit your activities  · Do not drive or operate hazardous machinery  · If you have not urinated within 8 hours after discharge, please contact your surgeon on call. · Do not make important personal or business decisions  · Do not drink alcoholic beverages    Report the following to your surgeon:  · Excessive pain, swelling, redness or odor of or around the surgical area  · Temperature over 100.5 degrees  · Nausea and vomiting lasting longer than 4 hours or if unable to take medication  · Any signs of decreased circulation or nerve impairment to extremity:  Change in color, persistent numbness, tingling, coldness or increased pain.   · Any questions

## 2022-03-19 PROBLEM — M14.672 CHARCOT ANKLE, LEFT: Status: ACTIVE | Noted: 2021-02-19

## 2022-03-19 PROBLEM — M14.672 CHARCOT'S JOINT OF LEFT FOOT: Status: ACTIVE | Noted: 2021-02-18

## 2023-04-17 NOTE — PROGRESS NOTES
Cecilia NP said that hospitalists were okay with patient being discharged since he cleared PT. Labs/Imaging Studies/Medications

## 2023-05-11 RX ORDER — FUROSEMIDE 20 MG/1
20 TABLET ORAL DAILY
COMMUNITY

## 2023-05-11 RX ORDER — PRAVASTATIN SODIUM 10 MG
10 TABLET ORAL NIGHTLY
COMMUNITY

## 2023-05-11 RX ORDER — ASPIRIN 81 MG/1
81 TABLET ORAL DAILY
COMMUNITY

## 2023-05-11 RX ORDER — CETIRIZINE HYDROCHLORIDE 10 MG/1
TABLET ORAL EVERY MORNING
COMMUNITY

## 2023-05-11 RX ORDER — PROMETHAZINE HYDROCHLORIDE 12.5 MG/1
TABLET ORAL EVERY 6 HOURS PRN
COMMUNITY
Start: 2021-02-19

## 2023-05-11 RX ORDER — VERAPAMIL HYDROCHLORIDE 240 MG/1
CAPSULE, EXTENDED RELEASE ORAL
COMMUNITY

## 2023-05-11 RX ORDER — INSULIN ASPART 100 [IU]/ML
INJECTION, SOLUTION INTRAVENOUS; SUBCUTANEOUS
COMMUNITY

## 2023-05-11 RX ORDER — LEVOTHYROXINE SODIUM 88 UG/1
TABLET ORAL
COMMUNITY

## 2023-05-11 RX ORDER — FLUTICASONE PROPIONATE 50 MCG
2 SPRAY, SUSPENSION (ML) NASAL DAILY
COMMUNITY

## (undated) DEVICE — C-ARM: Brand: UNBRANDED

## (undated) DEVICE — TRAP SURG QUAD PARABOLA SLOT DSGN SFTY SCRN TRAPEASE

## (undated) DEVICE — INTENDED FOR TISSUE SEPARATION, AND OTHER PROCEDURES THAT REQUIRE A SHARP SURGICAL BLADE TO PUNCTURE OR CUT.: Brand: BARD-PARKER ® CARBON RIB-BACK BLADES

## (undated) DEVICE — Device

## (undated) DEVICE — SUTURE VCRL SZ 4-0 L27IN ABSRB UD L19MM PS-2 3/8 CIR PRIM J426H

## (undated) DEVICE — INFECTION CONTROL KIT SYS

## (undated) DEVICE — NEEDLE HYPO 25GA L1.5IN BVL ORIENTED ECLIPSE

## (undated) DEVICE — SUTURE VCRL SZ 3-0 L27IN ABSRB UD FS-2 L19MM 1/2 CIR J423H

## (undated) DEVICE — COVER,MAYO STAND,STERILE: Brand: MEDLINE

## (undated) DEVICE — DRAPE XR C ARM 41X74IN LF --

## (undated) DEVICE — SOLUTION SURG PREP 26 CC PURPREP

## (undated) DEVICE — 4.0 COUNTERSINK

## (undated) DEVICE — STERILE POLYISOPRENE POWDER-FREE SURGICAL GLOVES WITH EMOLLIENT COATING: Brand: PROTEXIS

## (undated) DEVICE — EXTREMITY - SMH: Brand: MEDLINE INDUSTRIES, INC.

## (undated) DEVICE — BANDAGE,GAUZE,CONFORMING,4"X75",STRL,LF: Brand: MEDLINE

## (undated) DEVICE — DRAIN SURG FLAT W7MMXL20CM FULL PERF

## (undated) DEVICE — PADDING CST CRMPD 3INX4YD NS --

## (undated) DEVICE — SURGICAL PROCEDURE PACK BASIN MAJ SET CUST NO CAUT

## (undated) DEVICE — SYR 10ML LUER LOK 1/5ML GRAD --

## (undated) DEVICE — BANDAGE,GAUZE,CONFORMING,3"X75",STRL,LF: Brand: MEDLINE

## (undated) DEVICE — SOLUTION IRRIG 1000ML 0.9% SOD CHL USP POUR PLAS BTL

## (undated) DEVICE — SUTURE VCRL SZ 2-0 L27IN ABSRB UD L26MM SH 1/2 CIR J417H

## (undated) DEVICE — SPONGE GZ W4XL4IN COT 12 PLY TYP VII WVN C FLD DSGN

## (undated) DEVICE — ZIMMER® STERILE DISPOSABLE TOURNIQUET CUFF WITH PLC, DUAL PORT, SINGLE BLADDER, 34 IN. (86 CM)

## (undated) DEVICE — RASP SURG W5.5XL11MM REPL RECIP SM TEAR CRSS CUT STRYKR FOR

## (undated) DEVICE — Z DISCONTINUED USE 2425483 (LOW STOCK PER MEDLINE) TAPE UMB L18IN DIA1/8IN WHT COT NONABSORBABLE W/O NDL FOR

## (undated) DEVICE — DRESSING PETRO W3XL8IN N ADH OIL EMUL GZ CURAD

## (undated) DEVICE — SUTURE ETHLN SZ 4-0 L18IN NONABSORBABLE BLK L19MM PS-2 3/8 1667H

## (undated) DEVICE — RESERVOIR,SUCTION,100CC,SILICONE: Brand: MEDLINE

## (undated) DEVICE — COVER LT HNDL PLAS RIG 1 PER PK

## (undated) DEVICE — SOL IRR STRL H2O 1000ML BTL --

## (undated) DEVICE — DRAPE,REIN 53X77,STERILE: Brand: MEDLINE

## (undated) DEVICE — STRAP,POSITIONING,KNEE/BODY,FOAM,4X60": Brand: MEDLINE

## (undated) DEVICE — BANDAGE,ELASTIC,ESMARK,STERILE,4"X9',LF: Brand: MEDLINE

## (undated) DEVICE — 4.0MM EGG

## (undated) DEVICE — BLADE SAW SM BNE 31X9.8X0.4X0.6 MM STRYKR NS DISP

## (undated) DEVICE — SUTURE VCRL 2-0 L27IN ABSRB UD PS-2 L19MM 1/2 CIR J428H

## (undated) DEVICE — BNDG ELAS HK LOOP 6X5YD NS -- MATRIX

## (undated) DEVICE — TUBING SUCT 10FR MAL ALUM SHFT FN CAP VENT UNIV CONN W/ OBT

## (undated) DEVICE — BNDG ELAS HK LOOP 4X5YD NS -- MATRIX

## (undated) DEVICE — DRAPE,EXTREMITY,89X128,STERILE: Brand: MEDLINE

## (undated) DEVICE — SOL IRR SOD CL 0.9% 1000ML BTL --

## (undated) DEVICE — SUT ETHLN 3-0 18IN PS2 BLK --

## (undated) DEVICE — BUR SURG HD L95MM DIA4MM REPL CARB OVL LINVATEC MEDTRONIC

## (undated) DEVICE — PENCIL ES L3M BTTN SWCH S STL HEX LOK BLDE ELECTRD HOLSTER

## (undated) DEVICE — SCREW EXT FIX L14MM FOR DISTRCTN

## (undated) DEVICE — BONE MARROW ASPIRATION & PREPARATION ACCESSORIES KIT

## (undated) DEVICE — NEEDLE HYPO 27GA L1.25IN GRY POLYPR HUB S STL REG BVL STR

## (undated) DEVICE — 2.2 K-WIRE, TROCAR, 250MM, 10/PKG: Brand: APTUS

## (undated) DEVICE — BANDAGE,GAUZE,BULKEE II,4.5"X4.1YD,STRL: Brand: MEDLINE

## (undated) DEVICE — DRESSING,GAUZE,XEROFORM,CURAD,1"X8",ST: Brand: CURAD

## (undated) DEVICE — GLOVE ORANGE PI 7 1/2   MSG9075

## (undated) DEVICE — SUTURE FIBERWIRE SZ 2 W/ TAPERED NEEDLE BLUE L38IN NONABSORB BLU L26.5MM 1/2 CIRCLE AR7200

## (undated) DEVICE — GOWN,SIRUS,FABRNF,XL,20/CS: Brand: MEDLINE

## (undated) DEVICE — TOWEL,OR,DSP,ST,BLUE,STD,4/PK,20PK/CS: Brand: MEDLINE

## (undated) DEVICE — REM POLYHESIVE ADULT PATIENT RETURN ELECTRODE: Brand: VALLEYLAB

## (undated) DEVICE — TOWEL SURG W17XL27IN STD BLU COT NONFENESTRATED PREWASHED

## (undated) DEVICE — SUTURE VCRL SZ 3-0 L27IN ABSRB UD L26MM SH 1/2 CIR J416H

## (undated) DEVICE — PADDING CST 4INX4YD --

## (undated) DEVICE — GAUZE,SPONGE,FLUFF,6"X6.75",STRL,5/TRAY: Brand: MEDLINE

## (undated) DEVICE — SNARE ENDOSCP M L240CM W27MM SHTH DIA2.4MM CHN 2.8MM OVL

## (undated) DEVICE — PAD,ABDOMINAL,5"X9",ST,LF,25/BX: Brand: MEDLINE INDUSTRIES, INC.

## (undated) DEVICE — MAGNETIC INSTR DRAPE 20X16: Brand: MEDLINE INDUSTRIES, INC.

## (undated) DEVICE — PACK,BASIC,SIRUS,V: Brand: MEDLINE

## (undated) DEVICE — DRESSING,GAUZE,XEROFORM,CURAD,5"X9",ST: Brand: CURAD

## (undated) DEVICE — DISPOSABLE TOURNIQUET CUFF SINGLE BLADDER, DUAL PORT AND QUICK CONNECT CONNECTOR: Brand: COLOR CUFF

## (undated) DEVICE — BANDAGE,GAUZE,CONFORMING,2"X75",STRL,LF: Brand: MEDLINE INDUSTRIES, INC.

## (undated) DEVICE — WORKING LENGTH 235CM, WORKING CHANNEL 2.8MM: Brand: RESOLUTION 360 CLIP